# Patient Record
Sex: FEMALE | Race: WHITE | NOT HISPANIC OR LATINO | Employment: OTHER | URBAN - METROPOLITAN AREA
[De-identification: names, ages, dates, MRNs, and addresses within clinical notes are randomized per-mention and may not be internally consistent; named-entity substitution may affect disease eponyms.]

---

## 2023-03-16 PROBLEM — R11.2 NAUSEA WITH VOMITING: Status: ACTIVE | Noted: 2023-03-16

## 2023-03-16 PROBLEM — F32.A ANXIETY AND DEPRESSION: Status: ACTIVE | Noted: 2023-03-16

## 2023-03-16 PROBLEM — K91.30 SMALL BOWEL ANASTOMOTIC STRICTURE: Status: ACTIVE | Noted: 2023-03-16

## 2023-03-16 PROBLEM — K63.8219 SMALL INTESTINAL BACTERIAL OVERGROWTH: Status: ACTIVE | Noted: 2023-03-16

## 2023-03-16 PROBLEM — T40.601A NARCOTIC BOWEL SYNDROME (MULTI): Status: ACTIVE | Noted: 2023-03-16

## 2023-03-16 PROBLEM — R10.13 EPIGASTRIC PAIN: Status: ACTIVE | Noted: 2023-03-16

## 2023-03-16 PROBLEM — K43.9 HERNIA, EPIGASTRIC: Status: ACTIVE | Noted: 2023-03-16

## 2023-03-16 PROBLEM — R19.7 DIARRHEA: Status: ACTIVE | Noted: 2023-03-16

## 2023-03-16 PROBLEM — K91.89 SMALL BOWEL ANASTOMOTIC STRICTURE: Status: ACTIVE | Noted: 2023-03-16

## 2023-03-16 PROBLEM — F41.9 ANXIETY AND DEPRESSION: Status: ACTIVE | Noted: 2023-03-16

## 2023-03-16 PROBLEM — K58.9 IRRITABLE BOWEL SYNDROME (IBS): Status: ACTIVE | Noted: 2023-03-16

## 2023-03-16 PROBLEM — F11.90 OPIOID USE DISORDER: Status: ACTIVE | Noted: 2023-03-16

## 2023-03-16 PROBLEM — M79.10 MYALGIA: Status: ACTIVE | Noted: 2023-03-16

## 2023-03-16 PROBLEM — M25.50 ARTHRALGIA: Status: ACTIVE | Noted: 2023-03-16

## 2023-03-16 PROBLEM — R10.11 ABDOMINAL PAIN, RUQ (RIGHT UPPER QUADRANT): Status: ACTIVE | Noted: 2023-03-16

## 2023-03-16 PROBLEM — F43.10 PTSD (POST-TRAUMATIC STRESS DISORDER): Status: ACTIVE | Noted: 2023-03-16

## 2023-03-16 PROBLEM — R14.0 ABDOMINAL BLOATING: Status: ACTIVE | Noted: 2023-03-16

## 2023-03-16 PROBLEM — K50.011: Status: ACTIVE | Noted: 2023-03-16

## 2023-03-16 PROBLEM — K59.00 CONSTIPATION: Status: ACTIVE | Noted: 2023-03-16

## 2023-03-16 PROBLEM — L03.032 PARONYCHIA OF TOE OF LEFT FOOT: Status: ACTIVE | Noted: 2023-03-16

## 2023-03-16 PROBLEM — G57.93 NEUROPATHY INVOLVING BOTH LOWER EXTREMITIES: Status: ACTIVE | Noted: 2023-03-16

## 2023-03-16 PROBLEM — T30.0 BURN: Status: ACTIVE | Noted: 2023-03-16

## 2023-03-16 PROBLEM — K63.89 NARCOTIC BOWEL SYNDROME (MULTI): Status: ACTIVE | Noted: 2023-03-16

## 2023-03-16 PROBLEM — R20.0 BILATERAL HAND NUMBNESS: Status: ACTIVE | Noted: 2023-03-16

## 2023-03-16 PROBLEM — T14.8XXA WOUND OF SKIN: Status: ACTIVE | Noted: 2023-03-16

## 2023-03-16 PROBLEM — F17.200 NICOTINE DEPENDENCE: Status: ACTIVE | Noted: 2023-03-16

## 2023-03-16 PROBLEM — Q45.3 PANCREAS DIVISUM: Status: ACTIVE | Noted: 2023-03-16

## 2023-03-16 RX ORDER — BUPRENORPHINE AND NALOXONE 8; 2 MG/1; MG/1
1 FILM, SOLUBLE BUCCAL; SUBLINGUAL 2 TIMES DAILY
COMMUNITY
Start: 2022-04-26 | End: 2023-03-20 | Stop reason: ALTCHOICE

## 2023-03-16 RX ORDER — BUPROPION HYDROCHLORIDE 150 MG/1
1 TABLET, EXTENDED RELEASE ORAL 2 TIMES DAILY
COMMUNITY
Start: 2022-09-23 | End: 2023-03-20 | Stop reason: SDUPTHER

## 2023-03-16 RX ORDER — RISANKIZUMAB-RZAA 60 MG/ML
600 INJECTION INTRAVENOUS
COMMUNITY
Start: 2022-09-15 | End: 2023-08-31 | Stop reason: ALTCHOICE

## 2023-03-16 RX ORDER — GABAPENTIN 300 MG/1
1 CAPSULE ORAL 3 TIMES DAILY
COMMUNITY
Start: 2022-05-04 | End: 2023-03-20 | Stop reason: SDUPTHER

## 2023-03-16 RX ORDER — HYDROXYZINE HYDROCHLORIDE 50 MG/1
1 TABLET, FILM COATED ORAL 3 TIMES DAILY PRN
COMMUNITY
End: 2023-03-20 | Stop reason: SDUPTHER

## 2023-03-16 RX ORDER — PRAZOSIN HYDROCHLORIDE 1 MG/1
1 CAPSULE ORAL NIGHTLY
COMMUNITY
End: 2023-03-20 | Stop reason: ALTCHOICE

## 2023-03-16 RX ORDER — SIMETHICONE 80 MG
1 TABLET,CHEWABLE ORAL 4 TIMES DAILY
COMMUNITY
Start: 2022-08-14

## 2023-03-16 RX ORDER — OLANZAPINE 5 MG/1
1 TABLET ORAL NIGHTLY
COMMUNITY
End: 2023-03-20 | Stop reason: SDUPTHER

## 2023-03-16 RX ORDER — IVERMECTIN 3 MG/1
6 TABLET ORAL ONCE
COMMUNITY
End: 2023-03-20 | Stop reason: ALTCHOICE

## 2023-03-16 RX ORDER — POLYETHYLENE GLYCOL 3350 17 G/17G
238 POWDER, FOR SOLUTION ORAL
COMMUNITY
Start: 2022-05-11 | End: 2023-12-07 | Stop reason: DRUGHIGH

## 2023-03-16 RX ORDER — LORATADINE 10 MG/1
1 TABLET ORAL DAILY PRN
COMMUNITY

## 2023-03-16 RX ORDER — BUSPIRONE HYDROCHLORIDE 30 MG/1
1 TABLET ORAL 2 TIMES DAILY
COMMUNITY
End: 2023-03-20 | Stop reason: SDUPTHER

## 2023-03-16 RX ORDER — CLONIDINE HYDROCHLORIDE 0.2 MG/1
1 TABLET ORAL 3 TIMES DAILY
COMMUNITY
End: 2023-03-20 | Stop reason: SDUPTHER

## 2023-03-16 RX ORDER — QUETIAPINE FUMARATE 50 MG/1
1-2 TABLET, FILM COATED ORAL NIGHTLY PRN
COMMUNITY
End: 2023-03-20 | Stop reason: ALTCHOICE

## 2023-03-16 RX ORDER — BUPROPION HYDROCHLORIDE 300 MG/1
1 TABLET ORAL DAILY
COMMUNITY
End: 2023-03-20 | Stop reason: ALTCHOICE

## 2023-03-16 RX ORDER — PANTOPRAZOLE SODIUM 40 MG/1
1 TABLET, DELAYED RELEASE ORAL DAILY
COMMUNITY
Start: 2022-05-02

## 2023-03-16 RX ORDER — RISANKIZUMAB-RZAA 360 MG/2.4
360 WEARABLE INJECTOR SUBCUTANEOUS
COMMUNITY
Start: 2022-09-15 | End: 2023-08-31 | Stop reason: ALTCHOICE

## 2023-03-16 RX ORDER — ONDANSETRON 4 MG/1
1 TABLET, ORALLY DISINTEGRATING ORAL EVERY 8 HOURS PRN
COMMUNITY

## 2023-03-16 RX ORDER — ESCITALOPRAM OXALATE 20 MG/1
1 TABLET ORAL DAILY
COMMUNITY
End: 2023-03-20 | Stop reason: SDUPTHER

## 2023-03-16 RX ORDER — DULOXETIN HYDROCHLORIDE 30 MG/1
CAPSULE, DELAYED RELEASE ORAL
COMMUNITY
Start: 2022-10-04 | End: 2023-03-20 | Stop reason: SDUPTHER

## 2023-03-20 ENCOUNTER — TELEMEDICINE (OUTPATIENT)
Dept: PRIMARY CARE | Facility: CLINIC | Age: 43
End: 2023-03-20
Payer: COMMERCIAL

## 2023-03-20 DIAGNOSIS — F32.A ANXIETY AND DEPRESSION: Primary | ICD-10-CM

## 2023-03-20 DIAGNOSIS — F41.9 ANXIETY AND DEPRESSION: Primary | ICD-10-CM

## 2023-03-20 DIAGNOSIS — M79.10 MYALGIA: ICD-10-CM

## 2023-03-20 PROCEDURE — 99214 OFFICE O/P EST MOD 30 MIN: CPT | Performed by: INTERNAL MEDICINE

## 2023-03-20 RX ORDER — GABAPENTIN 300 MG/1
300 CAPSULE ORAL 3 TIMES DAILY
Qty: 270 CAPSULE | Refills: 0 | Status: SHIPPED | OUTPATIENT
Start: 2023-03-20 | End: 2023-06-01 | Stop reason: SDUPTHER

## 2023-03-20 RX ORDER — BUSPIRONE HYDROCHLORIDE 30 MG/1
30 TABLET ORAL 2 TIMES DAILY
Qty: 180 TABLET | Refills: 0 | Status: SHIPPED | OUTPATIENT
Start: 2023-03-20 | End: 2023-06-14

## 2023-03-20 RX ORDER — HYDROXYZINE HYDROCHLORIDE 50 MG/1
50 TABLET, FILM COATED ORAL 3 TIMES DAILY PRN
Qty: 270 TABLET | Refills: 0 | Status: SHIPPED | OUTPATIENT
Start: 2023-03-20 | End: 2023-06-29 | Stop reason: SDUPTHER

## 2023-03-20 RX ORDER — CLONIDINE HYDROCHLORIDE 0.2 MG/1
0.2 TABLET ORAL 3 TIMES DAILY
Qty: 270 TABLET | Refills: 0 | Status: SHIPPED | OUTPATIENT
Start: 2023-03-20 | End: 2023-06-01 | Stop reason: SDUPTHER

## 2023-03-20 RX ORDER — ESCITALOPRAM OXALATE 20 MG/1
20 TABLET ORAL DAILY
Qty: 90 TABLET | Refills: 0 | Status: SHIPPED | OUTPATIENT
Start: 2023-03-20 | End: 2023-06-14

## 2023-03-20 RX ORDER — QUETIAPINE FUMARATE 200 MG/1
200 TABLET, FILM COATED ORAL NIGHTLY
Qty: 90 TABLET | Refills: 0 | Status: SHIPPED | OUTPATIENT
Start: 2023-03-20 | End: 2023-06-01 | Stop reason: SDUPTHER

## 2023-03-20 RX ORDER — OLANZAPINE 5 MG/1
5 TABLET ORAL NIGHTLY
Qty: 90 TABLET | Refills: 0 | Status: SHIPPED | OUTPATIENT
Start: 2023-03-20 | End: 2023-06-29 | Stop reason: SDUPTHER

## 2023-03-20 RX ORDER — QUETIAPINE FUMARATE 25 MG/1
25 TABLET, FILM COATED ORAL 2 TIMES DAILY
Qty: 180 TABLET | Refills: 0 | Status: SHIPPED | OUTPATIENT
Start: 2023-03-20 | End: 2023-06-01 | Stop reason: SDUPTHER

## 2023-03-20 RX ORDER — BUPROPION HYDROCHLORIDE 150 MG/1
150 TABLET, EXTENDED RELEASE ORAL 2 TIMES DAILY
Qty: 180 TABLET | Refills: 0 | Status: SHIPPED | OUTPATIENT
Start: 2023-03-20 | End: 2023-06-14

## 2023-03-20 RX ORDER — DULOXETIN HYDROCHLORIDE 30 MG/1
30 CAPSULE, DELAYED RELEASE ORAL DAILY
Qty: 90 CAPSULE | Refills: 0 | Status: SHIPPED | OUTPATIENT
Start: 2023-03-20 | End: 2023-06-14

## 2023-03-20 NOTE — PROGRESS NOTES
Subjective   Patient ID: Kym Jennings is a 42 y.o. female who presents for Follow-up.    HPI   Patient has been sober since June 6, 2021. No longer on buprenorphine. She is living in [place] with family. She is only attending occasional meetings, sponsor is [employer]. Reports anxiety and depression are well controlled. Has 2 panic attacks weekly, takes hydroxyzine which helps. No SI. No longer following at [facility] for mental health issues.   Review of Systems   All other systems reviewed and are negative.      Objective   There were no vitals taken for this visit.    Physical Exam  Constitutional:       Appearance: Normal appearance.   Pulmonary:      Effort: Pulmonary effort is normal.   Neurological:      Mental Status: She is alert.   Psychiatric:         Mood and Affect: Mood normal.         Behavior: Behavior normal.         Thought Content: Thought content normal.         Assessment/Plan       #Anxiety, depression , PTSD  -Refill buspirone 30mg BID, clonidine 0.2mg TID, escitalopram 20mg daily, duloxetine ER 30mg daily, hydroxyzine 50mg TID prn, Zyprexa 5mg daily, quetiapine 25mg BID and 200mg nightly, bupropion SR 150mg BID   -Recommended follow up with Ashley Sarah at [facility] for further mental health care    #Myalgia   -Refill gabapentin 300mg TID

## 2023-06-01 DIAGNOSIS — F41.9 ANXIETY AND DEPRESSION: ICD-10-CM

## 2023-06-01 DIAGNOSIS — M79.10 MYALGIA: ICD-10-CM

## 2023-06-01 DIAGNOSIS — F32.A ANXIETY AND DEPRESSION: ICD-10-CM

## 2023-06-01 RX ORDER — QUETIAPINE FUMARATE 25 MG/1
25 TABLET, FILM COATED ORAL 2 TIMES DAILY
Qty: 180 TABLET | Refills: 0 | Status: SHIPPED | OUTPATIENT
Start: 2023-06-01 | End: 2023-06-29 | Stop reason: ALTCHOICE

## 2023-06-01 RX ORDER — QUETIAPINE FUMARATE 200 MG/1
200 TABLET, FILM COATED ORAL NIGHTLY
Qty: 90 TABLET | Refills: 0 | Status: SHIPPED | OUTPATIENT
Start: 2023-06-01 | End: 2023-06-29 | Stop reason: SDUPTHER

## 2023-06-01 RX ORDER — CLONIDINE HYDROCHLORIDE 0.2 MG/1
TABLET ORAL 3 TIMES DAILY PRN
COMMUNITY
Start: 2022-02-17 | End: 2023-08-31 | Stop reason: ALTCHOICE

## 2023-06-01 RX ORDER — CLONIDINE HYDROCHLORIDE 0.2 MG/1
0.2 TABLET ORAL 3 TIMES DAILY
Qty: 270 TABLET | Refills: 0 | Status: SHIPPED | OUTPATIENT
Start: 2023-06-01 | End: 2023-06-29 | Stop reason: SDUPTHER

## 2023-06-01 RX ORDER — GABAPENTIN 300 MG/1
300 CAPSULE ORAL 3 TIMES DAILY
Qty: 270 CAPSULE | Refills: 0 | Status: SHIPPED | OUTPATIENT
Start: 2023-06-01 | End: 2023-06-29 | Stop reason: SDUPTHER

## 2023-06-01 NOTE — TELEPHONE ENCOUNTER
Requested Prescriptions     Pending Prescriptions Disp Refills    gabapentin (Neurontin) 300 mg capsule 270 capsule 0     Sig: Take 1 capsule (300 mg) by mouth 3 times a day.    QUEtiapine (SEROquel) 25 mg tablet 180 tablet 0     Sig: Take 1 tablet (25 mg) by mouth 2 times a day.    QUEtiapine (Seroquel) 200 mg tablet 90 tablet 0     Sig: Take 1 tablet (200 mg) by mouth once daily at bedtime.    cloNIDine (Catapres) 0.2 mg tablet 270 tablet 0     Sig: Take 1 tablet (0.2 mg) by mouth 3 times a day.

## 2023-06-13 DIAGNOSIS — F41.9 ANXIETY AND DEPRESSION: ICD-10-CM

## 2023-06-13 DIAGNOSIS — F32.A ANXIETY AND DEPRESSION: ICD-10-CM

## 2023-06-14 RX ORDER — BUSPIRONE HYDROCHLORIDE 30 MG/1
TABLET ORAL
Qty: 180 TABLET | Refills: 0 | Status: SHIPPED | OUTPATIENT
Start: 2023-06-14 | End: 2023-06-29 | Stop reason: SDUPTHER

## 2023-06-14 RX ORDER — DULOXETIN HYDROCHLORIDE 30 MG/1
CAPSULE, DELAYED RELEASE ORAL
Qty: 90 CAPSULE | Refills: 0 | Status: SHIPPED | OUTPATIENT
Start: 2023-06-14 | End: 2023-06-29 | Stop reason: SDUPTHER

## 2023-06-14 RX ORDER — BUPROPION HYDROCHLORIDE 150 MG/1
TABLET, EXTENDED RELEASE ORAL
Qty: 180 TABLET | Refills: 0 | Status: SHIPPED | OUTPATIENT
Start: 2023-06-14 | End: 2023-06-29 | Stop reason: SDUPTHER

## 2023-06-14 RX ORDER — ESCITALOPRAM OXALATE 20 MG/1
TABLET ORAL
Qty: 90 TABLET | Refills: 0 | Status: SHIPPED | OUTPATIENT
Start: 2023-06-14 | End: 2023-06-29 | Stop reason: SDUPTHER

## 2023-06-28 ENCOUNTER — APPOINTMENT (OUTPATIENT)
Dept: PRIMARY CARE | Facility: CLINIC | Age: 43
End: 2023-06-28
Payer: MEDICAID

## 2023-06-29 ENCOUNTER — OFFICE VISIT (OUTPATIENT)
Dept: PRIMARY CARE | Facility: CLINIC | Age: 43
End: 2023-06-29
Payer: MEDICAID

## 2023-06-29 VITALS
SYSTOLIC BLOOD PRESSURE: 150 MMHG | OXYGEN SATURATION: 96 % | DIASTOLIC BLOOD PRESSURE: 88 MMHG | BODY MASS INDEX: 26.47 KG/M2 | HEART RATE: 102 BPM | WEIGHT: 169 LBS

## 2023-06-29 DIAGNOSIS — F11.90 OPIOID USE DISORDER: Primary | ICD-10-CM

## 2023-06-29 DIAGNOSIS — M79.10 MYALGIA: ICD-10-CM

## 2023-06-29 DIAGNOSIS — F41.9 ANXIETY AND DEPRESSION: ICD-10-CM

## 2023-06-29 DIAGNOSIS — R30.0 DYSURIA: ICD-10-CM

## 2023-06-29 DIAGNOSIS — F32.A ANXIETY AND DEPRESSION: ICD-10-CM

## 2023-06-29 DIAGNOSIS — Z79.899 MEDICATION MANAGEMENT: ICD-10-CM

## 2023-06-29 PROCEDURE — 80358 DRUG SCREENING METHADONE: CPT

## 2023-06-29 PROCEDURE — 80365 DRUG SCREENING OXYCODONE: CPT

## 2023-06-29 PROCEDURE — 87491 CHLMYD TRACH DNA AMP PROBE: CPT

## 2023-06-29 PROCEDURE — 80368 SEDATIVE HYPNOTICS: CPT

## 2023-06-29 PROCEDURE — 80361 OPIATES 1 OR MORE: CPT

## 2023-06-29 PROCEDURE — 80354 DRUG SCREENING FENTANYL: CPT

## 2023-06-29 PROCEDURE — 80373 DRUG SCREENING TRAMADOL: CPT

## 2023-06-29 PROCEDURE — 80307 DRUG TEST PRSMV CHEM ANLYZR: CPT

## 2023-06-29 PROCEDURE — 99214 OFFICE O/P EST MOD 30 MIN: CPT | Performed by: INTERNAL MEDICINE

## 2023-06-29 PROCEDURE — 87591 N.GONORRHOEAE DNA AMP PROB: CPT

## 2023-06-29 PROCEDURE — 80346 BENZODIAZEPINES1-12: CPT

## 2023-06-29 RX ORDER — BUPRENORPHINE AND NALOXONE 8; 2 MG/1; MG/1
1 FILM, SOLUBLE BUCCAL; SUBLINGUAL 2 TIMES DAILY
Qty: 14 FILM | Refills: 0 | Status: SHIPPED | OUTPATIENT
Start: 2023-06-29 | End: 2023-07-07 | Stop reason: ALTCHOICE

## 2023-06-29 RX ORDER — BUPROPION HYDROCHLORIDE 150 MG/1
150 TABLET, EXTENDED RELEASE ORAL 2 TIMES DAILY
Qty: 180 TABLET | Refills: 0 | Status: SHIPPED | OUTPATIENT
Start: 2023-06-29

## 2023-06-29 RX ORDER — BUSPIRONE HYDROCHLORIDE 30 MG/1
30 TABLET ORAL 2 TIMES DAILY
Qty: 180 TABLET | Refills: 0 | Status: SHIPPED | OUTPATIENT
Start: 2023-06-29

## 2023-06-29 RX ORDER — GABAPENTIN 300 MG/1
300 CAPSULE ORAL 3 TIMES DAILY
Qty: 270 CAPSULE | Refills: 0 | Status: SHIPPED | OUTPATIENT
Start: 2023-06-29 | End: 2023-08-31 | Stop reason: SDUPTHER

## 2023-06-29 RX ORDER — CLONIDINE HYDROCHLORIDE 0.2 MG/1
0.2 TABLET ORAL 3 TIMES DAILY
Qty: 270 TABLET | Refills: 0 | Status: SHIPPED | OUTPATIENT
Start: 2023-06-29

## 2023-06-29 RX ORDER — BUPRENORPHINE 300 MG/1
300 SOLUTION SUBCUTANEOUS
Qty: 1.5 ML | Refills: 1 | Status: SHIPPED | OUTPATIENT
Start: 2023-06-29 | End: 2023-08-28 | Stop reason: SDUPTHER

## 2023-06-29 RX ORDER — BUPRENORPHINE AND NALOXONE 8; 2 MG/1; MG/1
FILM, SOLUBLE BUCCAL; SUBLINGUAL
COMMUNITY
Start: 2023-06-21 | End: 2023-06-29 | Stop reason: ALTCHOICE

## 2023-06-29 RX ORDER — QUETIAPINE FUMARATE 200 MG/1
200 TABLET, FILM COATED ORAL NIGHTLY
Qty: 90 TABLET | Refills: 0 | Status: SHIPPED | OUTPATIENT
Start: 2023-06-29

## 2023-06-29 RX ORDER — OLANZAPINE 5 MG/1
5 TABLET ORAL NIGHTLY
Qty: 90 TABLET | Refills: 0 | Status: SHIPPED | OUTPATIENT
Start: 2023-06-29

## 2023-06-29 RX ORDER — DULOXETIN HYDROCHLORIDE 30 MG/1
CAPSULE, DELAYED RELEASE ORAL
Qty: 90 CAPSULE | Refills: 0 | Status: SHIPPED | OUTPATIENT
Start: 2023-06-29 | End: 2023-07-21 | Stop reason: ALTCHOICE

## 2023-06-29 RX ORDER — HYDROXYZINE HYDROCHLORIDE 50 MG/1
50 TABLET, FILM COATED ORAL 3 TIMES DAILY PRN
Qty: 270 TABLET | Refills: 0 | Status: SHIPPED | OUTPATIENT
Start: 2023-06-29

## 2023-06-29 RX ORDER — ESCITALOPRAM OXALATE 20 MG/1
20 TABLET ORAL DAILY
Qty: 90 TABLET | Refills: 0 | Status: SHIPPED | OUTPATIENT
Start: 2023-06-29

## 2023-06-29 NOTE — PROGRESS NOTES
Subjective   Patient ID: Kym Jennings is a 42 y.o. female who presents for UTI and Med Refill.    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 No sponsor     Reports urinary urgency x2 weeks. +dysuria but no frequency. Took 4 days of bactrim at [facility].      Review of Systems   All other systems reviewed and are negative.      Objective   /88   Pulse 102   Wt 76.7 kg (169 lb)   SpO2 96%   BMI 26.47 kg/m²     Physical Exam  Constitutional:       General: She is not in acute distress.     Appearance: Normal appearance.   HENT:      Head: Normocephalic and atraumatic.      Right Ear: Tympanic membrane normal.      Left Ear: Tympanic membrane normal.      Nose: Nose normal.      Mouth/Throat:      Mouth: Mucous membranes are moist.   Eyes:      Extraocular Movements: Extraocular movements intact.      Conjunctiva/sclera: Conjunctivae normal.      Pupils: Pupils are equal, round, and reactive to light.   Cardiovascular:      Rate and Rhythm: Normal rate and regular rhythm.      Heart sounds: Normal heart sounds. No murmur heard.     No gallop.   Pulmonary:      Effort: Pulmonary effort is normal. No respiratory distress.      Breath sounds: No wheezing, rhonchi or rales.   Abdominal:      General: Abdomen is flat. Bowel sounds are normal.      Palpations: Abdomen is soft.      Tenderness: There is no abdominal tenderness.   Musculoskeletal:         General: No swelling. Normal range of motion.      Cervical back: Normal range of motion and neck supple.   Skin:     General: Skin is warm and dry.      Capillary Refill: Capillary refill takes less than 2 seconds.      Findings: No rash.   Neurological:      General: No focal deficit present.      Mental Status: She is alert and oriented to person, place,  and time. Mental status is at baseline.   Psychiatric:         Mood and Affect: Mood normal.         Behavior: Behavior normal.         Assessment/Plan       #OUD  -Rx Suboxone 8/2mg film BID x7 days   -UDS sent to lab (+meth, amp IO)  -Will get CSA next visit   -Cont PHP and meetings   -Will order Sublocade 300mg for next week   -  I have personally reviewed the OARRS report for . This report is scanned into the electronic medical record. I have considered the risks of abuse, dependence, addiction and diversion.      #Anxiety, depression , PTSD  -Refill buspirone 30mg BID, clonidine 0.2mg TID, escitalopram 20mg daily, duloxetine ER 30mg daily, hydroxyzine 50mg TID prn, Zyprexa 5mg daily, quetiapine 25mg BID and 200mg nightly, bupropion SR 150mg BID     #Dysuria   -UA without LE or nitrite  -order GC/Chlam, if neg will have her follow up with gyn     RTC 1 week for Sublocade

## 2023-06-30 LAB
CHLAMYDIA TRACH., AMPLIFIED: NEGATIVE
N. GONORRHEA, AMPLIFIED: NEGATIVE

## 2023-07-07 ENCOUNTER — OFFICE VISIT (OUTPATIENT)
Dept: PRIMARY CARE | Facility: CLINIC | Age: 43
End: 2023-07-07
Payer: MEDICAID

## 2023-07-07 VITALS
WEIGHT: 172 LBS | DIASTOLIC BLOOD PRESSURE: 77 MMHG | SYSTOLIC BLOOD PRESSURE: 118 MMHG | BODY MASS INDEX: 26.94 KG/M2 | HEART RATE: 99 BPM | OXYGEN SATURATION: 96 %

## 2023-07-07 DIAGNOSIS — F11.90 OPIOID USE DISORDER: Primary | ICD-10-CM

## 2023-07-07 DIAGNOSIS — F41.9 ANXIETY AND DEPRESSION: ICD-10-CM

## 2023-07-07 DIAGNOSIS — F32.A ANXIETY AND DEPRESSION: ICD-10-CM

## 2023-07-07 PROCEDURE — 80348 DRUG SCREENING BUPRENORPHINE: CPT

## 2023-07-07 PROCEDURE — 99213 OFFICE O/P EST LOW 20 MIN: CPT | Performed by: INTERNAL MEDICINE

## 2023-07-07 RX ORDER — ESTRADIOL 1 MG/1
1 TABLET ORAL DAILY
Status: CANCELLED | OUTPATIENT
Start: 2023-07-07

## 2023-07-07 RX ORDER — ESTRADIOL 1 MG/1
1 TABLET ORAL DAILY
COMMUNITY
Start: 2022-10-26

## 2023-07-07 NOTE — PROGRESS NOTES
Subjective   Patient ID: Kym Jennings is a 42 y.o. female who presents for Follow-up.    HPI     Overall doing well   Denies use of illicit drug use. No EtoH use   Mood is stable   Not sleeping well , not taking her Seroquel due to next day lethargy  Going to NA meetings nightly, still going to PHP   Now working with [facility]   No med side effects     Review of Systems   All other systems reviewed and are negative.      Objective   /77   Pulse 99   Wt 78 kg (172 lb)   SpO2 96%   BMI 26.94 kg/m²     Physical Exam  Constitutional:       Appearance: Normal appearance.   Pulmonary:      Effort: Pulmonary effort is normal.   Neurological:      Mental Status: She is alert.   Psychiatric:         Mood and Affect: Mood normal.         Behavior: Behavior normal.         Thought Content: Thought content normal.         Assessment/Plan       #OUD  -Sublocade 300mg subcutaneous x 1 given (right abdomen, LOT: [id number], Ex 6/2004)  -UDS sent to lab   -Will get CSA next visit   -Cont PHP and meetings   -  I have personally reviewed the OARRS report for . This report is scanned into the electronic medical record. I have considered the risks of abuse, dependence, addiction and diversion.    Not assessed:        #Anxiety, depression , PTSD  -Refill buspirone 30mg BID, clonidine 0.2mg TID, escitalopram 20mg daily, duloxetine ER 30mg daily, hydroxyzine 50mg TID prn, Zyprexa 5mg daily, quetiapine 25mg BID and 200mg nightly, bupropion SR 150mg BID

## 2023-07-10 ENCOUNTER — APPOINTMENT (OUTPATIENT)
Dept: PRIMARY CARE | Facility: CLINIC | Age: 43
End: 2023-07-10
Payer: MEDICAID

## 2023-07-13 LAB
6-ACETYLMORPHINE: <25 NG/ML
7-AMINOCLONAZEPAM: <25 NG/ML
ALPHA-HYDROXYALPRAZOLAM: <25 NG/ML
ALPHA-HYDROXYMIDAZOLAM: <25 NG/ML
ALPRAZOLAM: <25 NG/ML
AMPHETAMINE (PRESENCE) IN URINE BY SCREEN METHOD: NORMAL
BARBITURATES PRESENCE IN URINE BY SCREEN METHOD: NORMAL
BUPRENORPHINE ,URINE: 3 NG/ML
BUPRENORPHINE GLUC, URINE: 371 NG/ML
CANNABINOIDS IN URINE BY SCREEN METHOD: NORMAL
CHLORDIAZEPOXIDE: <25 NG/ML
CLONAZEPAM: <25 NG/ML
COCAINE (PRESENCE) IN URINE BY SCREEN METHOD: NORMAL
CODEINE: <50 NG/ML
CREATINE, URINE FOR DRUG: 125.1 MG/DL
DIAZEPAM: <25 NG/ML
DRUG SCREEN COMMENT URINE: NORMAL
EDDP: <25 NG/ML
FENTANYL CONFIRMATION, URINE: <2.5 NG/ML
HYDROCODONE: <25 NG/ML
HYDROMORPHONE: <25 NG/ML
LORAZEPAM: <25 NG/ML
METHADONE CONFIRMATION,URINE: <25 NG/ML
MIDAZOLAM: <25 NG/ML
MORPHINE URINE: <50 NG/ML
NALOXONE, URINE: <100 NG/ML
NORBUPRENORPHINE GLUC,URINE: 587 NG/ML
NORBUPRENORPHINE, URINE: 118 NG/ML
NORDIAZEPAM: <25 NG/ML
NORFENTANYL: <2.5 NG/ML
NORHYDROCODONE: <25 NG/ML
NOROXYCODONE: <25 NG/ML
O-DESMETHYLTRAMADOL: <50 NG/ML
OXAZEPAM: <25 NG/ML
OXYCODONE: <25 NG/ML
OXYMORPHONE: <25 NG/ML
PHENCYCLIDINE (PRESENCE) IN URINE BY SCREEN METHOD: NORMAL
TEMAZEPAM: <25 NG/ML
TRAMADOL: <50 NG/ML
ZOLPIDEM METABOLITE (ZCA): <25 NG/ML
ZOLPIDEM: <25 NG/ML

## 2023-07-17 ENCOUNTER — TELEPHONE (OUTPATIENT)
Dept: PRIMARY CARE | Facility: CLINIC | Age: 43
End: 2023-07-17
Payer: MEDICAID

## 2023-07-19 ENCOUNTER — APPOINTMENT (OUTPATIENT)
Dept: PRIMARY CARE | Facility: CLINIC | Age: 43
End: 2023-07-19
Payer: MEDICAID

## 2023-07-21 ENCOUNTER — TELEMEDICINE (OUTPATIENT)
Dept: PRIMARY CARE | Facility: CLINIC | Age: 43
End: 2023-07-21
Payer: MEDICAID

## 2023-07-21 ENCOUNTER — APPOINTMENT (OUTPATIENT)
Dept: PRIMARY CARE | Facility: CLINIC | Age: 43
End: 2023-07-21
Payer: MEDICAID

## 2023-07-21 DIAGNOSIS — F32.A ANXIETY AND DEPRESSION: Primary | ICD-10-CM

## 2023-07-21 DIAGNOSIS — F41.9 ANXIETY AND DEPRESSION: Primary | ICD-10-CM

## 2023-07-21 PROCEDURE — 99442 PR PHYS/QHP TELEPHONE EVALUATION 11-20 MIN: CPT | Performed by: INTERNAL MEDICINE

## 2023-07-21 RX ORDER — DULOXETIN HYDROCHLORIDE 60 MG/1
60 CAPSULE, DELAYED RELEASE ORAL DAILY
Qty: 30 CAPSULE | Refills: 3 | Status: SHIPPED | OUTPATIENT
Start: 2023-07-21 | End: 2024-02-06

## 2023-07-21 NOTE — PROGRESS NOTES
Subjective   Patient ID: Kym Jennings is a 43 y.o. female who presents for Follow-up.    HPI     Over the past anxiety is getting much worse  Cravings have increased   She deneis illicit drugs   Has been to 4 meetings this week and PHP daily   She did a pysch assessment with [facility] 2 weeks ago and they not addressed her psych issues    Review of Systems   All other systems reviewed and are negative.      Objective   There were no vitals taken for this visit.    NAD  Talks in complete sentences  Mood and affect are appropriate       Assessment/Plan       #Anxiety, depression, PTSD  -Will increase duloxetine ER to 60mg daily  -Cont buspirone 30mg BID, clonidine 0.2mg TID, escitalopram 20mg daily, , hydroxyzine 50mg TID prn, Zyprexa 5mg daily, quetiapine 25mg BID and 200mg nightly, bupropion SR 150mg BID   -Recommended she contact [facility] for further psych recs

## 2023-08-28 DIAGNOSIS — F11.90 OPIOID USE DISORDER: ICD-10-CM

## 2023-08-28 RX ORDER — BUPRENORPHINE 300 MG/1
300 SOLUTION SUBCUTANEOUS
Qty: 1.5 ML | Refills: 1 | Status: CANCELLED | OUTPATIENT
Start: 2023-08-28 | End: 2023-10-27

## 2023-08-28 RX ORDER — BUPRENORPHINE 300 MG/1
300 SOLUTION SUBCUTANEOUS
Qty: 1.5 ML | Refills: 0 | Status: SHIPPED | OUTPATIENT
Start: 2023-08-28 | End: 2023-09-27

## 2023-08-29 NOTE — PROGRESS NOTES
Subjective   Patient ID: Kym Jennings is a 43 y.o. female who presents for Med Management.    HPI     Patient turned herself in to [facility] for previous huffing charges  She has been using Adderall , meth , THC and suboxone  She has been seeing [facility] and getting 12mg daily   Mood has been stable  Will likely be going to detention in 4 days   Dad is going into hospice for cancer     Review of Systems   All other systems reviewed and are negative.      Objective   There were no vitals taken for this visit.    Physical Exam  Constitutional:       Appearance: Normal appearance.   HENT:      Head: Normocephalic and atraumatic.   Cardiovascular:      Rate and Rhythm: Normal rate and regular rhythm.      Heart sounds: Normal heart sounds. No murmur heard.     No gallop.   Pulmonary:      Effort: Pulmonary effort is normal. No respiratory distress.      Breath sounds: No wheezing or rales.   Skin:     General: Skin is warm and dry.      Findings: No rash.   Neurological:      Mental Status: She is alert and oriented to person, place, and time. Mental status is at baseline.   Psychiatric:         Mood and Affect: Mood normal.         Behavior: Behavior normal.         Assessment/Plan       #OUD  -Sublocade 300mg subcutaneous x 1 given (left abdomen, LOT: [id number], Ex 1/2025)  -UDS sent to lab   -CSA today    -  I have personally reviewed the OARRS report for . This report is scanned into the electronic medical record. I have considered the risks of abuse, dependence, addiction and diversion.    Patient to follow up in 4 weeks if she does not go to [facility]

## 2023-08-31 ENCOUNTER — OFFICE VISIT (OUTPATIENT)
Dept: PRIMARY CARE | Facility: CLINIC | Age: 43
End: 2023-08-31
Payer: MEDICAID

## 2023-08-31 VITALS
BODY MASS INDEX: 24.96 KG/M2 | DIASTOLIC BLOOD PRESSURE: 82 MMHG | SYSTOLIC BLOOD PRESSURE: 127 MMHG | WEIGHT: 159 LBS | HEIGHT: 67 IN | OXYGEN SATURATION: 98 % | HEART RATE: 122 BPM

## 2023-08-31 DIAGNOSIS — F11.90 OPIOID USE DISORDER: ICD-10-CM

## 2023-08-31 DIAGNOSIS — M79.10 MYALGIA: ICD-10-CM

## 2023-08-31 DIAGNOSIS — Z79.899 MEDICATION MANAGEMENT: Primary | ICD-10-CM

## 2023-08-31 DIAGNOSIS — L73.9 FOLLICULITIS: ICD-10-CM

## 2023-08-31 PROCEDURE — 80349 CANNABINOIDS NATURAL: CPT

## 2023-08-31 PROCEDURE — 80361 OPIATES 1 OR MORE: CPT

## 2023-08-31 PROCEDURE — 80307 DRUG TEST PRSMV CHEM ANLYZR: CPT

## 2023-08-31 PROCEDURE — 99213 OFFICE O/P EST LOW 20 MIN: CPT | Performed by: INTERNAL MEDICINE

## 2023-08-31 PROCEDURE — 80348 DRUG SCREENING BUPRENORPHINE: CPT

## 2023-08-31 PROCEDURE — 80373 DRUG SCREENING TRAMADOL: CPT

## 2023-08-31 PROCEDURE — 80365 DRUG SCREENING OXYCODONE: CPT

## 2023-08-31 PROCEDURE — 80354 DRUG SCREENING FENTANYL: CPT

## 2023-08-31 PROCEDURE — 80368 SEDATIVE HYPNOTICS: CPT

## 2023-08-31 PROCEDURE — 80346 BENZODIAZEPINES1-12: CPT

## 2023-08-31 PROCEDURE — 80324 DRUG SCREEN AMPHETAMINES 1/2: CPT

## 2023-08-31 PROCEDURE — 80358 DRUG SCREENING METHADONE: CPT

## 2023-08-31 RX ORDER — BUPRENORPHINE AND NALOXONE 8; 2 MG/1; MG/1
FILM, SOLUBLE BUCCAL; SUBLINGUAL
COMMUNITY
Start: 2023-08-01 | End: 2023-11-06 | Stop reason: ALTCHOICE

## 2023-08-31 RX ORDER — DICLOFENAC SODIUM 10 MG/G
GEL TOPICAL
COMMUNITY
Start: 2023-06-07

## 2023-08-31 RX ORDER — GABAPENTIN 300 MG/1
300 CAPSULE ORAL 3 TIMES DAILY
Qty: 90 CAPSULE | Refills: 0 | Status: SHIPPED | OUTPATIENT
Start: 2023-08-31

## 2023-08-31 RX ORDER — DOXYCYCLINE 100 MG/1
100 CAPSULE ORAL 2 TIMES DAILY
Qty: 14 CAPSULE | Refills: 0 | Status: SHIPPED | OUTPATIENT
Start: 2023-08-31 | End: 2023-09-07

## 2023-08-31 RX ORDER — TRAZODONE HYDROCHLORIDE 100 MG/1
TABLET ORAL
COMMUNITY
Start: 2023-06-07

## 2023-08-31 RX ORDER — SERTRALINE HYDROCHLORIDE 50 MG/1
TABLET, FILM COATED ORAL
COMMUNITY
Start: 2023-06-20 | End: 2023-12-07

## 2023-08-31 ASSESSMENT — PATIENT HEALTH QUESTIONNAIRE - PHQ9
SUM OF ALL RESPONSES TO PHQ9 QUESTIONS 1 AND 2: 0
2. FEELING DOWN, DEPRESSED OR HOPELESS: NOT AT ALL
1. LITTLE INTEREST OR PLEASURE IN DOING THINGS: NOT AT ALL

## 2023-08-31 NOTE — PATIENT INSTRUCTIONS
You were given Sublocade 300mg injection today (this is your second one and you will need 100mg injection for next dose)    Take doxycycline for 7 days   Gabapentin sent to pharmacy

## 2023-09-04 LAB
AMPHETAMINES,URINE: >5000 NG/ML
BUPRENORPHINE ,URINE: 3 NG/ML
BUPRENORPHINE GLUC, URINE: 256 NG/ML
MDA,URINE: <200 NG/ML
MDEA,URINE: <200 NG/ML
MDMA,UR: <200 NG/ML
METHAMPHETAMINE QUANTITATIVE URINE: NORMAL NG/ML
NALOXONE, URINE: <100 NG/ML
NORBUPRENORPHINE GLUC,URINE: 325 NG/ML
NORBUPRENORPHINE, URINE: 177 NG/ML
PHENTERMINE,UR: <200 NG/ML

## 2023-09-05 LAB — 11-NOR-9-CARBOXY-THC, URN, QUANT: 46 NG/ML

## 2023-09-07 LAB
6-ACETYLMORPHINE: <25 NG/ML
7-AMINOCLONAZEPAM: <25 NG/ML
ALPHA-HYDROXYALPRAZOLAM: <25 NG/ML
ALPHA-HYDROXYMIDAZOLAM: <25 NG/ML
ALPRAZOLAM: <25 NG/ML
AMPHETAMINE (PRESENCE) IN URINE BY SCREEN METHOD: ABNORMAL
BARBITURATES PRESENCE IN URINE BY SCREEN METHOD: ABNORMAL
CANNABINOIDS IN URINE BY SCREEN METHOD: ABNORMAL
CHLORDIAZEPOXIDE: <25 NG/ML
CLONAZEPAM: <25 NG/ML
COCAINE (PRESENCE) IN URINE BY SCREEN METHOD: ABNORMAL
CODEINE: <50 NG/ML
CREATINE, URINE FOR DRUG: 156.8 MG/DL
DIAZEPAM: <25 NG/ML
DRUG SCREEN COMMENT URINE: ABNORMAL
EDDP: <25 NG/ML
FENTANYL CONFIRMATION, URINE: <2.5 NG/ML
HYDROCODONE: <25 NG/ML
HYDROMORPHONE: <25 NG/ML
LORAZEPAM: <25 NG/ML
METHADONE CONFIRMATION,URINE: <25 NG/ML
MIDAZOLAM: <25 NG/ML
MORPHINE URINE: <50 NG/ML
NORDIAZEPAM: <25 NG/ML
NORFENTANYL: <2.5 NG/ML
NORHYDROCODONE: <25 NG/ML
NOROXYCODONE: <25 NG/ML
O-DESMETHYLTRAMADOL: <50 NG/ML
OXAZEPAM: <25 NG/ML
OXYCODONE: <25 NG/ML
OXYMORPHONE: <25 NG/ML
PHENCYCLIDINE (PRESENCE) IN URINE BY SCREEN METHOD: ABNORMAL
TEMAZEPAM: <25 NG/ML
TRAMADOL: <50 NG/ML
ZOLPIDEM METABOLITE (ZCA): <25 NG/ML
ZOLPIDEM: <25 NG/ML

## 2023-11-06 DIAGNOSIS — F11.90 OPIOID USE DISORDER: Primary | ICD-10-CM

## 2023-11-06 RX ORDER — BUPRENORPHINE 300 MG/1
300 SOLUTION SUBCUTANEOUS
Qty: 1.5 ML | Refills: 0 | Status: SHIPPED | OUTPATIENT
Start: 2023-11-06 | End: 2023-12-04 | Stop reason: SDUPTHER

## 2023-11-08 NOTE — PROGRESS NOTES
Medication Documentation Review Audit       Reviewed by Anastasia Dinero RN (Registered Nurse) on 11/06/23 at 1732      Medication Order Taking? Sig Documenting Provider Last Dose Status   buprenorphine ER (Sublocade) 300 mg/1.5mL injection [id number]  Inject 1.5 mL (1 each) under the skin every month to absorb continually. Aldair Castro DO  Active      Discontinued 11/06/23 1630   buPROPion SR (Wellbutrin SR) 150 mg 12 hr tablet [id number]  Take 1 tablet (150 mg) by mouth 2 times a day. Do not crush, chew, or split. Aldair Castro DO  Active   busPIRone (Buspar) 30 mg tablet [id number]  Take 1 tablet (30 mg) by mouth 2 times a day. Aldair Castro DO  Active   cloNIDine (Catapres) 0.2 mg tablet [id number] No Take 1 tablet (0.2 mg) by mouth 3 times a day. Aldair Castro DO Taking Active   diclofenac sodium (Voltaren) 1 % gel gel [id number]   Historical Provider, MD  Active   DULoxetine (Cymbalta) 60 mg DR capsule [id number]  Take 1 capsule (60 mg) by mouth once daily. Do not crush or chew. Aldair Castro DO  Active   escitalopram (Lexapro) 20 mg tablet [id number]  Take 1 tablet (20 mg) by mouth once daily. Aldair Castro DO  Active   estradiol (Estrace) 1 mg tablet [id number]  Take 1 tablet (1 mg) by mouth once daily. Historical Provider, MD  Active   gabapentin (Neurontin) 300 mg capsule [id number]  Take 1 capsule (300 mg) by mouth 3 times a day. Aldair Castro DO  Active   hydrOXYzine HCL (Atarax) 50 mg tablet [id number]  Take 1 tablet (50 mg) by mouth 3 times a day as needed for anxiety. Aldair Castro DO  Active   loratadine (Claritin) 10 mg tablet [id number] No Take 1 tablet (10 mg) by mouth once daily as needed. Historical Provider, MD Not Taking Active   OLANZapine (ZyPREXA) 5 mg tablet [id number]  Take 1 tablet (5 mg) by mouth once daily at bedtime. Aldair Castro DO  Active   ondansetron ODT (Zofran-ODT) 4 mg disintegrating tablet [id number] No Take 1 tablet (4 mg) by mouth every 8 hours if needed for nausea. Historical Provider, MD Not  Taking Active   pantoprazole (ProtoNix) 40 mg EC tablet [id number] No Take 1 tablet (40 mg) by mouth once daily. Historical Provider, MD Not Taking Active   polyethylene glycol (Glycolax) 17 gram/dose powder [id number] No Take 238 g by mouth. Mix in 64 ounces of Gatorade or Powerade - not red, orange, purple, and use as directed for bowel prep Historical Provider, MD Not Taking Active   QUEtiapine (Seroquel) 200 mg tablet [id number] No Take 1 tablet (200 mg) by mouth once daily at bedtime. Aldair Castro, DO Taking Active   sertraline (Zoloft) 50 mg tablet [id number]   Historical Provider, MD  Active   simethicone (Mylicon) 80 mg chewable tablet [id number] No Chew 1 tablet (80 mg) 4 times a day. After meals and at bedtime Historical Provider, MD Not Taking Active   traZODone (Desyrel) 100 mg tablet [id number]   Historical Provider, MD  Active

## 2023-11-08 NOTE — PROGRESS NOTES
Subjective   Patient ID: Kym Jennings is a 43 y.o. female who presents for Med Management.    HPI     Patient brought in by  from [facility].   Needs Sublocade injection   Has not used illicit drugs or EtOH since our last visit   Does have some constipation, otherwise no side effects      Review of Systems    Objective   There were no vitals taken for this visit.    Physical Exam  Constitutional:       Appearance: Normal appearance.   Pulmonary:      Effort: Pulmonary effort is normal.   Neurological:      Mental Status: She is alert.   Psychiatric:         Mood and Affect: Mood normal.         Behavior: Behavior normal.         Thought Content: Thought content normal.         Assessment/Plan       #OUD  -Sublocade 300mg subcutaneous x 1 given (right abdomen, LOT: [id number], Ex 7/2024)  -Will get UDS next visit   -[facility] 8/31/23  -  I have personally reviewed the OARRS report for . This report is scanned into the electronic medical record. I have considered the risks of abuse, dependence, addiction and diversion.     Patient to follow up in 4 weeks --will repeat 300mg due to length of time after last injection

## 2023-11-09 ENCOUNTER — OFFICE VISIT (OUTPATIENT)
Dept: PRIMARY CARE | Facility: CLINIC | Age: 43
End: 2023-11-09
Payer: MEDICAID

## 2023-11-09 VITALS
OXYGEN SATURATION: 97 % | WEIGHT: 161 LBS | SYSTOLIC BLOOD PRESSURE: 125 MMHG | BODY MASS INDEX: 25.22 KG/M2 | HEART RATE: 83 BPM | DIASTOLIC BLOOD PRESSURE: 95 MMHG

## 2023-11-09 DIAGNOSIS — K59.03 DRUG-INDUCED CONSTIPATION: Primary | ICD-10-CM

## 2023-11-09 PROCEDURE — 99213 OFFICE O/P EST LOW 20 MIN: CPT | Performed by: INTERNAL MEDICINE

## 2023-11-09 RX ORDER — CEPHALEXIN 500 MG/1
500 CAPSULE ORAL 3 TIMES DAILY
COMMUNITY

## 2023-11-09 RX ORDER — DOCUSATE SODIUM 100 MG/1
100 CAPSULE, LIQUID FILLED ORAL 2 TIMES DAILY
Qty: 60 CAPSULE | Refills: 3 | Status: SHIPPED | OUTPATIENT
Start: 2023-11-09

## 2023-12-04 DIAGNOSIS — F11.90 OPIOID USE DISORDER: ICD-10-CM

## 2023-12-04 NOTE — TELEPHONE ENCOUNTER
Requested Prescriptions     Pending Prescriptions Disp Refills    buprenorphine ER (Sublocade) 300 mg/1.5mL injection 1.5 mL 0     Sig: Inject 1.5 mL (1 each) under the skin every month to absorb continually. XIANG:[id number]

## 2023-12-05 RX ORDER — BUPRENORPHINE 300 MG/1
300 SOLUTION SUBCUTANEOUS
Qty: 1.5 ML | Refills: 0 | Status: SHIPPED | OUTPATIENT
Start: 2023-12-05 | End: 2024-01-04 | Stop reason: SDUPTHER

## 2023-12-07 ENCOUNTER — OFFICE VISIT (OUTPATIENT)
Dept: PRIMARY CARE | Facility: CLINIC | Age: 43
End: 2023-12-07
Payer: MEDICAID

## 2023-12-07 VITALS
HEIGHT: 67 IN | BODY MASS INDEX: 25.22 KG/M2 | HEART RATE: 95 BPM | DIASTOLIC BLOOD PRESSURE: 85 MMHG | SYSTOLIC BLOOD PRESSURE: 114 MMHG

## 2023-12-07 DIAGNOSIS — Z72.51 HIGH RISK SEXUAL BEHAVIOR, UNSPECIFIED TYPE: ICD-10-CM

## 2023-12-07 DIAGNOSIS — K59.03 DRUG-INDUCED CONSTIPATION: ICD-10-CM

## 2023-12-07 DIAGNOSIS — R10.84 GENERALIZED ABDOMINAL PAIN: ICD-10-CM

## 2023-12-07 DIAGNOSIS — R30.0 DYSURIA: ICD-10-CM

## 2023-12-07 DIAGNOSIS — F11.90 OPIOID USE DISORDER: Primary | ICD-10-CM
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
## 2023-12-07 PROBLEM — R10.9 ABDOMINAL PAIN: Status: ACTIVE | Noted: 2023-12-07

## 2023-12-07 PROBLEM — F41.9 ANXIETY: Status: ACTIVE | Noted: 2023-12-07

## 2023-12-07 PROBLEM — S99.919A INJURY OF ANKLE: Status: ACTIVE | Noted: 2023-12-07

## 2023-12-07 PROBLEM — F43.9 STRESS: Status: ACTIVE | Noted: 2023-12-07

## 2023-12-07 PROBLEM — I10 DIASTOLIC HYPERTENSION: Status: ACTIVE | Noted: 2023-12-07

## 2023-12-07 PROBLEM — K50.90 CROHN'S DISEASE (MULTI): Status: ACTIVE | Noted: 2023-12-07

## 2023-12-07 PROBLEM — K50.10 EXACERBATION OF CROHN'S DISEASE OF LARGE INTESTINE (MULTI): Status: ACTIVE | Noted: 2023-12-07

## 2023-12-07 PROBLEM — N39.0 ACUTE LOWER URINARY TRACT INFECTION: Status: ACTIVE | Noted: 2023-12-07

## 2023-12-07 LAB
POC APPEARANCE, URINE: CLEAR
POC BILIRUBIN, URINE: NEGATIVE
POC BLOOD, URINE: NEGATIVE
POC COLOR, URINE: YELLOW
POC GLUCOSE, URINE: NEGATIVE MG/DL
POC KETONES, URINE: NEGATIVE MG/DL
POC LEUKOCYTES, URINE: NEGATIVE
POC NITRITE,URINE: NEGATIVE
POC PH, URINE: 6 PH
POC PROTEIN, URINE: NEGATIVE MG/DL
POC SPECIFIC GRAVITY, URINE: 1.02
POC UROBILINOGEN, URINE: 0.2 EU/DL

## 2023-12-07 PROCEDURE — 3074F SYST BP LT 130 MM HG: CPT | Performed by: INTERNAL MEDICINE

## 2023-12-07 PROCEDURE — 87800 DETECT AGNT MULT DNA DIREC: CPT

## 2023-12-07 PROCEDURE — 3079F DIAST BP 80-89 MM HG: CPT | Performed by: INTERNAL MEDICINE

## 2023-12-07 PROCEDURE — 81003 URINALYSIS AUTO W/O SCOPE: CPT | Performed by: INTERNAL MEDICINE

## 2023-12-07 PROCEDURE — 99214 OFFICE O/P EST MOD 30 MIN: CPT | Performed by: INTERNAL MEDICINE

## 2023-12-07 RX ORDER — POLYETHYLENE GLYCOL 3350 17 G/17G
POWDER, FOR SOLUTION ORAL
Qty: 72 PACKET | Refills: 0 | Status: SHIPPED | OUTPATIENT
Start: 2023-12-07

## 2023-12-07 NOTE — PATIENT INSTRUCTIONS
Opioid use disorder  Getting sublocade today 300mg /monthly, and to discuss with Dr. Castro next visit, possibility to go to the films  Urine screen is normal, in office testing  Also discussed with pt dosing per up to date, cannot increase dose and standard dosing is 100/month  Sublocade lot [id number], exp 7/2024    Abdominal pain with history of chronic pancreatitis and crohns  Checking pancreatitis markers,  I think this is all due to constipation  Take miralax 17gm in fluid 3 times today then twice daily, call if no response or if too loose    High risk sexual behavior, checking for hepatitis, syphilis, HIV, gonorrhea and chlamydia (urine)    Recheck in 1 month

## 2023-12-07 NOTE — PROGRESS NOTES
"Subjective   Patient ID: Kym Jennings is a 43 y.o. female who presents for Med Refill.    Has been on sublocade since August,   [facility]  Was on the suboxone  film for many years  She has chronic pancreatitis and it helped with that pain, she would like to go back to the films  She has been off dope and had issues with other drugs, she went off the suboxone film, due to being stolen [facility]  She went without it. So now is on the shot, she feels the shot doesn't cover her. It seems to help for 1 week then the other weeks she feels that it is not helping  She is in senior living currently until April, she is not using any drugs in there or etoh  She is going to [facility] for her psychiatric issues, sees Dr. Moscoso. She is having side effects from her medications  Pt is here in ankle and wrist shackles and wrist were removed for the exam by guard who was present at all times.     Is having some urinary issues. She was permiscuous, and she is worried about syphilis or hepatitis  She was on bactrim for a uti and still had blood in her urine  She has pain when she urinates, and urgency,   She has pain in her bladder, she has 1 ovary and had a full hysterectomy, she is not pregnant  They did a urine for chlamydia  She feels very backed up and constipated, is in the area of her pancreas  Pain when she eats, has to lay on her left side  Her bowels are constipated, has moved her bowels that last time about 4 days ago  She takes dulcusate, not getting the miralax, needs a paper script.   She has hemorrhoids now.   They are bleeding.   She has to strain to move her bowels.     Med Refill         Review of Systems    Objective   /85   Pulse 95   Ht 1.702 m (5' 7\")   BMI 25.22 kg/m²     Physical Exam  Constitutional:       Appearance: Normal appearance.   Cardiovascular:      Rate and Rhythm: Normal rate and regular rhythm.      Heart sounds: No murmur heard.  Abdominal:      Comments: Abdomen " is soft, diffusely tender particularly along course of colon and stool could be palpated along the course to the RUQ, no guarding but rebound noted.   Bs normal.   Skin:     Comments: Firm nodule felt at the RUQ where prior sublocade injected,    Neurological:      Mental Status: She is alert.   Psychiatric:         Mood and Affect: Mood normal.      Comments: Appears anxious         Assessment/Plan          Patient Instructions   Opioid use disorder  Getting sublocade today 300mg /monthly, and to discuss with Dr. Castro next visit, possibility to go to the films  Urine screen is normal    Abdominal pain with history of chronic pancreatitis and crohns  Checking pancreatitis markers,  I think this is all due to constipation  Take miralax 17gm in fluid 3 times today then twice daily, call if no response or if too loose    High risk sexual behavior, checking for hepatitis, syphilis, HIV, gonorrhea and chlamydia (urine)    Recheck in 1 month

## 2023-12-07 NOTE — PROGRESS NOTES
Subjective   Patient ID: Kym Jennings is a 43 y.o. female who presents for Med Refill.    HPI     Review of Systems    Objective   There were no vitals taken for this visit.    Physical Exam    Assessment/Plan

## 2023-12-08 LAB
C TRACH RRNA SPEC QL NAA+PROBE: NEGATIVE
N GONORRHOEA DNA SPEC QL PROBE+SIG AMP: NEGATIVE

## 2023-12-28 ENCOUNTER — HOSPITAL ENCOUNTER (OUTPATIENT)
Dept: RADIOLOGY | Facility: HOSPITAL | Age: 43
Discharge: HOME | End: 2023-12-28
Payer: COMMERCIAL

## 2023-12-28 DIAGNOSIS — R14.0 ABDOMINAL DISTENSION (GASEOUS): ICD-10-CM

## 2023-12-28 PROCEDURE — 74022 RADEX COMPL AQT ABD SERIES: CPT | Performed by: RADIOLOGY

## 2023-12-28 PROCEDURE — 74022 RADEX COMPL AQT ABD SERIES: CPT

## 2024-01-04 ENCOUNTER — APPOINTMENT (OUTPATIENT)
Dept: PRIMARY CARE | Facility: CLINIC | Age: 44
End: 2024-01-04
Payer: COMMERCIAL

## 2024-01-04 DIAGNOSIS — F11.90 OPIOID USE DISORDER: ICD-10-CM

## 2024-01-04 RX ORDER — BUPRENORPHINE 300 MG/1
300 SOLUTION SUBCUTANEOUS
Qty: 1.5 ML | Refills: 0 | Status: SHIPPED | OUTPATIENT
Start: 2024-01-04 | End: 2024-01-29 | Stop reason: ALTCHOICE

## 2024-01-04 NOTE — TELEPHONE ENCOUNTER
Requested Prescriptions     Pending Prescriptions Disp Refills    buprenorphine ER (Sublocade) 300 mg/1.5mL injection 1.5 mL 0     Sig: Inject 1.5 mL (1 each) under the skin every month to absorb continually. XIANG:[id number]

## 2024-01-05 ENCOUNTER — HOSPITAL ENCOUNTER (OUTPATIENT)
Dept: RADIOLOGY | Facility: HOSPITAL | Age: 44
Discharge: HOME | End: 2024-01-05
Payer: COMMERCIAL

## 2024-01-05 ENCOUNTER — OFFICE VISIT (OUTPATIENT)
Dept: PRIMARY CARE | Facility: CLINIC | Age: 44
End: 2024-01-05
Payer: MEDICAID

## 2024-01-05 VITALS
DIASTOLIC BLOOD PRESSURE: 92 MMHG | WEIGHT: 161 LBS | BODY MASS INDEX: 25.27 KG/M2 | HEIGHT: 67 IN | SYSTOLIC BLOOD PRESSURE: 117 MMHG | HEART RATE: 102 BPM

## 2024-01-05 DIAGNOSIS — R91.8 OTHER NONSPECIFIC ABNORMAL FINDING OF LUNG FIELD: ICD-10-CM

## 2024-01-05 DIAGNOSIS — F11.90 OPIOID USE DISORDER: ICD-10-CM

## 2024-01-05 PROCEDURE — 80365 DRUG SCREENING OXYCODONE: CPT

## 2024-01-05 PROCEDURE — 80307 DRUG TEST PRSMV CHEM ANLYZR: CPT

## 2024-01-05 PROCEDURE — 71260 CT THORAX DX C+: CPT | Performed by: RADIOLOGY

## 2024-01-05 PROCEDURE — 80354 DRUG SCREENING FENTANYL: CPT

## 2024-01-05 PROCEDURE — 3074F SYST BP LT 130 MM HG: CPT | Performed by: INTERNAL MEDICINE

## 2024-01-05 PROCEDURE — 80361 OPIATES 1 OR MORE: CPT

## 2024-01-05 PROCEDURE — 2550000001 HC RX 255 CONTRASTS: Performed by: PHYSICIAN ASSISTANT

## 2024-01-05 PROCEDURE — 3080F DIAST BP >= 90 MM HG: CPT | Performed by: INTERNAL MEDICINE

## 2024-01-05 PROCEDURE — 80373 DRUG SCREENING TRAMADOL: CPT

## 2024-01-05 PROCEDURE — 80346 BENZODIAZEPINES1-12: CPT

## 2024-01-05 PROCEDURE — 99213 OFFICE O/P EST LOW 20 MIN: CPT | Performed by: INTERNAL MEDICINE

## 2024-01-05 PROCEDURE — 82570 ASSAY OF URINE CREATININE: CPT

## 2024-01-05 PROCEDURE — 71260 CT THORAX DX C+: CPT

## 2024-01-05 PROCEDURE — 80358 DRUG SCREENING METHADONE: CPT

## 2024-01-05 PROCEDURE — 80368 SEDATIVE HYPNOTICS: CPT

## 2024-01-05 RX ADMIN — IOHEXOL 50 ML: 350 INJECTION, SOLUTION INTRAVENOUS at 10:27

## 2024-01-05 NOTE — PROGRESS NOTES
"Subjective   Patient ID: Kym Jennings is a 43 y.o. female who presents for Med Refill.    Pt here for sublocade injection, has been getting 300mg monthly  She states she has not been having any cravings  She still is having bloating and says she has bacterial overgrowth and needs rifaxamin   Is currently incarcerated. No illicit drug use or alcohol  She wants to know what her xrays show    Med Refill         Review of Systems    Objective   BP (!) 117/92   Pulse 102   Ht 1.702 m (5' 7\")   Wt 73 kg (161 lb)   BMI 25.22 kg/m²     Physical Exam  Constitutional:       Appearance: Normal appearance.   Abdominal:      Comments: Soft nontender,  Has subcut nodule left side of abdominal wall subcut, mobile, not tender  Injection of sublocade 300mg given (monthly) without complication   Neurological:      Mental Status: She is alert.   Psychiatric:         Mood and Affect: Mood normal.         Behavior: Behavior normal.         Assessment/Plan        OUD, on sublocade monthly 300mg  No evidence of withdrawal or sedation  UDS sent  CSA/bup, on 8/31/23  Recheck in 1 month    I have personally reviewed the OARRS report for Kym Jennings. This report is scanned into the electronic medical record. I have considered the risks of abuse, dependence, addiction and diversion.     "

## 2024-01-06 LAB
AMPHETAMINES UR QL SCN: NORMAL
BARBITURATES UR QL SCN: NORMAL
BZE UR QL SCN: NORMAL
CANNABINOIDS UR QL SCN: NORMAL
CREAT UR-MCNC: 105 MG/DL (ref 20–320)
PCP UR QL SCN: NORMAL

## 2024-01-09 LAB
BUPRENORPHINE UR-MCNC: <2 NG/ML
BUPRENORPHINE UR-MCNC: >1000 NG/ML
NALOXONE UR CFM-MCNC: <100 NG/ML
NORBUPRENORPHINE UR CFM-MCNC: 94 NG/ML
NORBUPRENORPHINE UR-MCNC: 19 NG/ML

## 2024-01-29 DIAGNOSIS — F11.90 OPIOID USE DISORDER: ICD-10-CM

## 2024-01-29 DIAGNOSIS — F11.90 OPIOID USE DISORDER: Primary | ICD-10-CM

## 2024-01-29 RX ORDER — BUPRENORPHINE 100 MG/1
1 SOLUTION SUBCUTANEOUS
Qty: 0.5 ML | Refills: 2 | Status: SHIPPED | OUTPATIENT
Start: 2024-01-29 | End: 2024-03-10 | Stop reason: SDUPTHER

## 2024-01-29 NOTE — TELEPHONE ENCOUNTER
Requested Prescriptions     Pending Prescriptions Disp Refills    buprenorphine ER (Sublocade) 300 mg/1.5mL injection 1.5 mL 0     Sig: Inject 1.5 mL (1 each) under the skin every month to absorb continually. XIANG:[id number]

## 2024-01-31 RX ORDER — BUPRENORPHINE 300 MG/1
300 SOLUTION SUBCUTANEOUS
Qty: 1.5 ML | Refills: 0 | OUTPATIENT
Start: 2024-01-31 | End: 2024-03-31

## 2024-02-06 ENCOUNTER — OFFICE VISIT (OUTPATIENT)
Dept: GASTROENTEROLOGY | Facility: HOSPITAL | Age: 44
End: 2024-02-06
Payer: COMMERCIAL

## 2024-02-06 VITALS
WEIGHT: 196 LBS | SYSTOLIC BLOOD PRESSURE: 109 MMHG | RESPIRATION RATE: 18 BRPM | BODY MASS INDEX: 29.03 KG/M2 | HEART RATE: 99 BPM | HEIGHT: 69 IN | OXYGEN SATURATION: 95 % | TEMPERATURE: 97.7 F | DIASTOLIC BLOOD PRESSURE: 69 MMHG

## 2024-02-06 DIAGNOSIS — K50.019 CROHN'S DISEASE OF SMALL INTESTINE WITH COMPLICATION (MULTI): Primary | ICD-10-CM

## 2024-02-06 DIAGNOSIS — K50.114: ICD-10-CM

## 2024-02-06 PROCEDURE — 3078F DIAST BP <80 MM HG: CPT | Performed by: INTERNAL MEDICINE

## 2024-02-06 PROCEDURE — 3074F SYST BP LT 130 MM HG: CPT | Performed by: INTERNAL MEDICINE

## 2024-02-06 PROCEDURE — 99214 OFFICE O/P EST MOD 30 MIN: CPT | Performed by: INTERNAL MEDICINE

## 2024-02-06 RX ORDER — BUDESONIDE 3 MG/1
CAPSULE, COATED PELLETS ORAL
Qty: 90 CAPSULE | Refills: 11 | Status: SHIPPED | OUTPATIENT
Start: 2024-02-06

## 2024-02-06 RX ORDER — METHYLPREDNISOLONE 4 MG/1
TABLET ORAL
Qty: 21 TABLET | Refills: 0 | Status: SHIPPED | OUTPATIENT
Start: 2024-02-06 | End: 2024-02-13

## 2024-02-06 RX ORDER — CIPROFLOXACIN 500 MG/1
500 TABLET ORAL 2 TIMES DAILY
Qty: 20 TABLET | Refills: 0 | Status: SHIPPED | OUTPATIENT
Start: 2024-02-06 | End: 2024-02-16

## 2024-02-06 RX ORDER — METRONIDAZOLE 500 MG/1
500 TABLET ORAL 2 TIMES DAILY
Qty: 20 TABLET | Refills: 0 | Status: SHIPPED | OUTPATIENT
Start: 2024-02-06 | End: 2024-02-16

## 2024-02-06 SDOH — ECONOMIC STABILITY: FOOD INSECURITY: WITHIN THE PAST 12 MONTHS, THE FOOD YOU BOUGHT JUST DIDN'T LAST AND YOU DIDN'T HAVE MONEY TO GET MORE.: NEVER TRUE

## 2024-02-06 SDOH — ECONOMIC STABILITY: FOOD INSECURITY: WITHIN THE PAST 12 MONTHS, YOU WORRIED THAT YOUR FOOD WOULD RUN OUT BEFORE YOU GOT MONEY TO BUY MORE.: NEVER TRUE

## 2024-02-06 ASSESSMENT — COLUMBIA-SUICIDE SEVERITY RATING SCALE - C-SSRS
6. HAVE YOU EVER DONE ANYTHING, STARTED TO DO ANYTHING, OR PREPARED TO DO ANYTHING TO END YOUR LIFE?: NO
1. IN THE PAST MONTH, HAVE YOU WISHED YOU WERE DEAD OR WISHED YOU COULD GO TO SLEEP AND NOT WAKE UP?: NO
2. HAVE YOU ACTUALLY HAD ANY THOUGHTS OF KILLING YOURSELF?: NO

## 2024-02-06 ASSESSMENT — ENCOUNTER SYMPTOMS
OCCASIONAL FEELINGS OF UNSTEADINESS: 0
LOSS OF SENSATION IN FEET: 0
DEPRESSION: 0

## 2024-02-06 ASSESSMENT — PATIENT HEALTH QUESTIONNAIRE - PHQ9
2. FEELING DOWN, DEPRESSED OR HOPELESS: NOT AT ALL
SUM OF ALL RESPONSES TO PHQ9 QUESTIONS 1 AND 2: 0
1. LITTLE INTEREST OR PLEASURE IN DOING THINGS: NOT AT ALL

## 2024-02-06 ASSESSMENT — PAIN SCALES - GENERAL: PAINLEVEL: 6

## 2024-02-06 NOTE — PROGRESS NOTES
"Department of Gastroenterology and Hepatolology Clinic Note      HPI  Kym Jennings is a 43 y.o. female with PMH of stricturing ileal Crohn's disease (Dx age 26, s/p Humira, Remicade, Cimzia, Entyvio, and Stelara, currently on Skyrizi since 10/2022) s/p multiple bowel resections (last resection in 2017), medical nonadherence, chronic pancreatitis s/p stent, opioid dependence, tobacco dependence, depression/anxiety and PTSD who presents to IBD clinic to ? establish care.    Patient was diagnosed with Crohn's disease in 2007. She underwent ileocolectomy and SBR due to stricturing complications in 2008 prior to any biologics therapy. She was started on adalimumab for post-op CD recurrence. She was lost to follow-up until 2011 when she developed GI symptoms and had been off adalimumab for several years. She was then placed on infliximab with periods of medication non-adherence in between.  Self reports severe pain from the injection site and malaise with Humira and \" ineffectiveness\" of Remicade; medical note also indicated patient developed staph infection of her port with Remicade.    There was a gap from 2013 to 2018 when she was lost to follow-up until she presented to GI clinic in 2018 (Dr. Mateus Allred) with RUQ pain and postprandial emesis and started Cimzia 5/2018 but self discontinued. Note 8/2018 was planning to start Stelara but lost to follow-up afterwards.    She then saw Dr. Rm Amor in 4/2019 after a CT flare required prednisone taper.  At the time and was started on vedo. She took vedolizumab for a year but then self stopped during the pandemic (last dose was March 2020) and reports Vedo did not seem to improve his symptoms.    Since 2020, she has had multiple hospitalizations for pSBO due to active CD with SB strictures.  Colonoscopy on 8/21/20 showed inflammation in the guicho-TI and otherwise nl exam.     CTAP on 2/6/22 showed long segment of guicho-TI with stricturing and mucosal hyperenhancement " concerning for skipped strictures. MRE on 2/14/22 showed stricture with active inflammation involving the guicho-TI resulting in mild dilatation of the small bowel proximal to it. She was started on prednisone course and received her first Stelara infusion on 3/29/22.     Patient initially responded well to Stelara. However, she was admitted on 8/10-8/14/22 to [facility] with worsening abd bloating. CTAP on 8/4/22 showed redundant colon with prominent gaseous distension with tapering at the sigmoid colon with pseudo pneumatosis colitis involving the R colon and ileitis.  Colonoscopy on 8/12/22 which showed congested friable mucosa with small ulcers in the guicho-TI consistent with Rutgeert's i3 and one sigmoid hyperplastic polyp.  FCP  8/2022 was 57.  At that time, she was living at the [facility] and takes Suboxone daily for drug rehab.     Saw Dr. Apoorva Light in fellows' clinic  in 3/2022 and then briefly followed with Dr. Hermilo Brown from 5/2022-10/2022 then lost to follow-up.  During her most recent visit she bdominal bloating with alternating diarrhea/constipation likely due to multifactorial etiologies (narcotic bowel syndrome and/or SIBO).  Patient was about to start Skyrizi during last visit with Dr. Brown.     Patient was admitted to [facility] in 12/2022 for suicidal ideation. Seen by Dr. Luz's team for abd pain thought to be a combination IBD, IBS and SIBO.     CT chest 1/2024 calcified granuloma in the left lower lobe, Markedly heterogeneous sclerotic appearance of the skeleton which is nonspecific and may relate to benign entity such as renal osteodystrophy. However, metastatic disease is not excluded.    Labs 12/12/23: hgb 11.6 with MCV 96, albumin 3, mildly elevated     In clinic today, patient is currently incarcerated at [facility] and will be released in 2 months (April 2024). She reports only got 3 doses of Skyrizi and has not had any Skyrizi for over a year. She thought it helped.  Also  thinks Xifaxan helped with abdominal bloating.      Patient noticed a perianal abscess 1 wk ago that erupted 2 days ago with drainage. She reports RLQ pain started 2 months ago, treated with a predinsone with 12 day taper (-24) with improvement of symptoms but pain worsened after finishing the taper. Also diffuse abd discomfort with gassiness and belatedness, 4-5 soft formed BM daily with urgency and intermittent small amount of blood in stools the past 2 months, waking up at least twice a night.     EIMs--Diffuse joint pain (knees, ankles, wrists), no eye pain/redness, some ithchy reddish bumps on hands and arms, no oral ulcers       ROS: All 10 systems reviewed are negative unless mentioned in HPI.     PMH: As above  PSH:   Past Surgical History:   Procedure Laterality Date    OTHER SURGICAL HISTORY  2022    Ovarian cystectomy    OTHER SURGICAL HISTORY  2022    Esophagogastroduodenoscopy    OTHER SURGICAL HISTORY  2022    Colonoscopy    OTHER SURGICAL HISTORY  2022    Small bowel resection    OTHER SURGICAL HISTORY  2022    Hysterectomy    OTHER SURGICAL HISTORY  2022     section     FH:   Family History   Problem Relation Name Age of Onset    Clotting disorder Father      Diabetes Other Paternal Relatives     Clotting disorder Other Paternal Relatives       SH:   Social History     Socioeconomic History    Marital status: Single     Spouse name: Not on file    Number of children: Not on file    Years of education: Not on file    Highest education level: Not on file   Occupational History    Not on file   Tobacco Use    Smoking status: Every Day     Packs/day: 1.00     Years: 20.00     Additional pack years: 0.00     Total pack years: 20.00     Types: Cigarettes    Smokeless tobacco: Never   Vaping Use    Vaping Use: Never used   Substance and Sexual Activity    Alcohol use: Not Currently    Drug use: Not Currently     Types: Methamphetamines, Amphetamines,  Marijuana    Sexual activity: Not Currently   Other Topics Concern    Not on file   Social History Narrative    Not on file     Social Determinants of Health     Financial Resource Strain: Not on file   Food Insecurity: No Food Insecurity (2/6/2024)    Hunger Vital Sign     Worried About Running Out of Food in the Last Year: Never true     Ran Out of Food in the Last Year: Never true   Transportation Needs: Not on file   Physical Activity: Not on file   Stress: Not on file   Social Connections: Not on file   Intimate Partner Violence: Not on file   Housing Stability: Not on file     Home meds:   Current Outpatient Medications   Medication Sig Dispense Refill    aripiprazole (ABILIFY MYCITE ORAL) Take by mouth.      buprenorphine ER (Sublocade) 100 mg/0.5mL injection Inject 0.5 mL (1 each) under the skin every month to absorb continually. 0.5 mL 2    buPROPion SR (Wellbutrin SR) 150 mg 12 hr tablet Take 1 tablet (150 mg) by mouth 2 times a day. Do not crush, chew, or split. 180 tablet 0    busPIRone (Buspar) 30 mg tablet Take 1 tablet (30 mg) by mouth 2 times a day. 180 tablet 0    cephalexin (Keflex) 500 mg capsule Take 1 capsule (500 mg) by mouth 3 times a day.      cloNIDine (Catapres) 0.2 mg tablet Take 1 tablet (0.2 mg) by mouth 3 times a day. 270 tablet 0    diclofenac sodium (Voltaren) 1 % gel gel       docusate sodium (Colace) 100 mg capsule Take 1 capsule (100 mg) by mouth 2 times a day. 60 capsule 3    DULoxetine (Cymbalta) 60 mg DR capsule Take 1 capsule (60 mg) by mouth once daily. Do not crush or chew. 30 capsule 3    escitalopram (Lexapro) 20 mg tablet Take 1 tablet (20 mg) by mouth once daily. 90 tablet 0    estradiol (Estrace) 1 mg tablet Take 1 tablet (1 mg) by mouth once daily.      gabapentin (Neurontin) 300 mg capsule Take 1 capsule (300 mg) by mouth 3 times a day. 90 capsule 0    hydrOXYzine HCL (Atarax) 50 mg tablet Take 1 tablet (50 mg) by mouth 3 times a day as needed for anxiety. 270 tablet 0  "   loratadine (Claritin) 10 mg tablet Take 1 tablet (10 mg) by mouth once daily as needed.      OLANZapine (ZyPREXA) 5 mg tablet Take 1 tablet (5 mg) by mouth once daily at bedtime. 90 tablet 0    ondansetron ODT (Zofran-ODT) 4 mg disintegrating tablet Take 1 tablet (4 mg) by mouth every 8 hours if needed for nausea.      pantoprazole (ProtoNix) 40 mg EC tablet Take 1 tablet (40 mg) by mouth once daily.      polyethylene glycol (Glycolax, Miralax) 17 gram packet Mix 1 cap (17g) into 8 ounces of fluid, take 3 times a day for 1 day then twice daily for constipation 72 packet 0    QUEtiapine (Seroquel) 200 mg tablet Take 1 tablet (200 mg) by mouth once daily at bedtime. 90 tablet 0    simethicone (Mylicon) 80 mg chewable tablet Chew 1 tablet (80 mg) 4 times a day. After meals and at bedtime      traZODone (Desyrel) 100 mg tablet        No current facility-administered medications for this visit.     Allergies:   Allergies   Allergen Reactions    Metoclopramide Hcl Unknown    Morphine Unknown    Vancomycin Unknown        VS: /69   Pulse 99   Temp 36.5 °C (97.7 °F)   Resp 18   Ht 1.753 m (5' 9\")   Wt 88.9 kg (196 lb)   SpO2 95%   BMI 28.94 kg/m²     PE:  GEN: Sitting in chair comfortably, NAD.  Patient shackled at wrists and legs in the office with 's deputies present.  NEURO: A&O x3   HEENT: No scleral icterus   CV: Distant heard sounds, RRR, no obvious m/r/g   PULM: LCTA, no wheezing/rhonchi/crackles   ABD: Soft, Tenderness all 4Qs worst RLQ, abdomen seems distended, but soft.  +BS; Rectal- consent obtained; chaperone present.  Small chronic appearing old posterior perianal scar/fistula site w/o erythema/induration/active drainage; minimally tender  : No suprapubic/CVA tenderness   EXT: No edema in LES   SKIN: Small red bumps on kunckle areas     LABS:  Lab Results   Component Value Date    WBC 5.2 12/12/2023    WBC 6.1 12/20/2022    WBC 8.1 12/07/2022    WBC 11.4 (H) 09/06/2022    WBC 14.3 (H) " 08/12/2022    HGB 11.6 (L) 12/12/2023    HGB 12.4 12/20/2022    HGB 14.0 12/07/2022    HGB 12.8 09/06/2022    HGB 12.1 08/12/2022    HCT 35.7 (L) 12/12/2023    HCT 36.9 12/20/2022    HCT 40.2 12/07/2022    HCT 36.7 09/06/2022    HCT 36.6 08/12/2022    MCV 96 12/12/2023    MCV 94 12/20/2022    MCV 92 12/07/2022    MCV 91 09/06/2022    MCV 95 08/12/2022     12/12/2023     12/20/2022     12/07/2022     09/06/2022     08/12/2022      Lab Results   Component Value Date    CREATININE 0.69 12/12/2023    CREATININE 0.73 12/20/2022    CREATININE 0.63 12/11/2022    BUN 9 12/12/2023    BUN 8 12/20/2022    BUN 10 12/11/2022     12/12/2023     12/20/2022     12/11/2022    K 4.2 12/12/2023    K 4.1 12/20/2022    K 3.7 12/11/2022     12/12/2023     12/20/2022     12/11/2022    CO2 27 12/12/2023    CO2 25 12/20/2022    CO2 29 12/11/2022      Lab Results   Component Value Date    INR 0.8 (L) 09/02/2020    INR 1.0 08/20/2020    INR 1.0 08/19/2020    PROTIME 9.5 (L) 09/02/2020    PROTIME 11.1 08/20/2020    PROTIME 11.5 08/19/2020      Lab Results   Component Value Date    ALT 11 12/12/2023    ALT 24 12/07/2022    ALT 18 09/06/2022    AST 14 12/12/2023    AST 26 12/07/2022    AST 21 09/06/2022    ALKPHOS 120 (H) 12/12/2023    ALKPHOS 79 12/07/2022    ALKPHOS 64 09/06/2022    BILITOT 0.6 12/12/2023    BILITOT 0.6 12/07/2022    BILITOT 0.4 09/06/2022     No results found for this or any previous visit (from the past 96 hour(s)).      IMAGING:   No results found.     Assessment:   #Crohn's disease-known recurrent ileal disease status post ileocolonic resection.  At last evaluation, this was Rutgeerts i3.  She now has symptoms of mild disease activity with abdominal discomfort, diarrhea, and bloating.  Some of this may also be SIBO.  We will go ahead and give her a Medrol Dosepak and start a 3-month budesonide taper.  This should help her feel better while she remains  incarcerated.    She did feel improved on Skyrizi and once she is released from long term, we could reinitiate this therapy.    # Possible perianal abscess-there is no active inflammation at this time, but we will go ahead and give her a 10-day course of ciprofloxacin and metronidazole, stopping the recently prescribed Keflex.  This should help prevent any worsening perianal disease and will also be effective for any small intestinal bacterial overgrowth that is present.    Plan  1) check labs at a [facility] laboratory as ordered  2) begin ciprofloxacin 500 mg twice daily and metronidazole 500 mg twice daily for 10 days.  Take these antibiotics with a small amount of food.  Also, take these in place of the recently prescribed Keflex  3) begin a Medrol Dosepak and complete further directions  4) at the same time as starting the Medrol Dosepak, begin budesonide (Entocort) 3 capsules once daily in the morning for 1 month and then lower by 1 capsule every month until off budesonide  5) follow-up at [facility] with Dr. Hermilo Brown in late April, 2024  6) when you are released from long term, call our nurse, Dayan Cleaning RN, at [phone] and let her know, so that we can get you restarted on Skyrizi    Tej Rivera MD (Gi fellow, PGY 6)    ATTENDING:  I saw and evaluated the patient.  I personally obtained the key and critical portions of the history and physical exam or was physically present for key and critical portions performed by the resident/fellow.  I reviewed the resident/fellow's documentation and discussed the patient with the resident/fellow.  I agree with the resident/fellow's medical decision making as documented in the note. See edited comments above.  Will treat small perianal abscess (drained spontaneously), treat for suspected recurrent ileal Crohn's disease, and plan for reinitiation of Skyrizi once patient released from long term.

## 2024-02-06 NOTE — PATIENT INSTRUCTIONS
As we discussed today, we will go ahead and treat the small perianal abscess that has spontaneously drained with 10 days of antibiotics.  This should also help treat any small intestinal bacterial overgrowth that may be contributing to your abdominal distention and abdominal discomfort.  Additionally, since we know that you have some recurrent Crohn's disease in these small intestine (neoterminal ileum) we will go ahead and give you a course of budesonide treatment.  Once you are released from snf, we will go ahead and reinitiate therapy with Skyrizi.  As reviewed today:    1) check labs at a [facility] laboratory as ordered  2) begin ciprofloxacin 500 mg twice daily and metronidazole 500 mg twice daily for 10 days.  Take these antibiotics with a small amount of food.  Also, take these in place of the recently prescribed Keflex  3) begin a Medrol Dosepak and complete further directions  4) at the same time as starting the Medrol Dosepak, begin budesonide (Entocort) 3 capsules once daily in the morning for 1 month and then lower by 1 capsule every month until off budesonide  5) follow-up at [facility] with Dr. Hermilo Brown in late April, 2024  6) when you are released from snf, call our nurse, Dayan Cleaning RN, at [phone] and let her know, so that we can get you restarted on Skyrizi

## 2024-02-07 ENCOUNTER — OFFICE VISIT (OUTPATIENT)
Dept: PRIMARY CARE | Facility: CLINIC | Age: 44
End: 2024-02-07
Payer: COMMERCIAL

## 2024-02-07 VITALS
OXYGEN SATURATION: 96 % | WEIGHT: 195 LBS | HEART RATE: 103 BPM | DIASTOLIC BLOOD PRESSURE: 72 MMHG | SYSTOLIC BLOOD PRESSURE: 118 MMHG | BODY MASS INDEX: 28.8 KG/M2

## 2024-02-07 DIAGNOSIS — F11.90 OPIOID USE DISORDER: Primary | ICD-10-CM

## 2024-02-07 PROCEDURE — 3074F SYST BP LT 130 MM HG: CPT | Performed by: INTERNAL MEDICINE

## 2024-02-07 PROCEDURE — 3078F DIAST BP <80 MM HG: CPT | Performed by: INTERNAL MEDICINE

## 2024-02-07 PROCEDURE — 99213 OFFICE O/P EST LOW 20 MIN: CPT | Performed by: INTERNAL MEDICINE

## 2024-02-07 NOTE — PROGRESS NOTES
Subjective   Patient ID: Kym Jennings is a 43 y.o. female who presents for Med Management.    HPI     Patient brought in by  from [facility].   Needs Sublocade injection   Has not used illicit drugs or EtOH since our last visit   No side effects     Review of Systems   All other systems reviewed and are negative.      Objective   There were no vitals taken for this visit.    Physical Exam  Constitutional:       Appearance: Normal appearance.   Pulmonary:      Effort: Pulmonary effort is normal.   Neurological:      Mental Status: She is alert.   Psychiatric:         Mood and Affect: Mood normal.         Behavior: Behavior normal.         Thought Content: Thought content normal.         Assessment/Plan       #OUD  -Sublocade 300mg subcutaneous x 1 given (right abdomen, LOT: [id number], Ex 2/2025)  -UDS:1/5/24  -CSA 8/31/23  -  I have personally reviewed the OARRS report for . This report is scanned into the electronic medical record. I have considered the risks of abuse, dependence, addiction and diversion.     Patient to follow up in 4 weeks

## 2024-03-06 ENCOUNTER — APPOINTMENT (OUTPATIENT)
Dept: PRIMARY CARE | Facility: CLINIC | Age: 44
End: 2024-03-06
Payer: COMMERCIAL

## 2024-03-10 DIAGNOSIS — F11.90 OPIOID USE DISORDER: ICD-10-CM

## 2024-03-10 NOTE — TELEPHONE ENCOUNTER
Requested Prescriptions     Pending Prescriptions Disp Refills    buprenorphine ER (Sublocade) 100 mg/0.5mL injection 0.5 mL 0     Sig: Inject 0.5 mL (1 each) under the skin every month to absorb continually.

## 2024-03-10 NOTE — PROGRESS NOTES
Subjective   Patient ID: Kym Jennings is a 43 y.o. female who presents for Med Management.    HPI     Patient brought in by  from [facility].   Needs Sublocade injection   Has not used illicit drugs or EtOH since our last visit   No side effects     Review of Systems   All other systems reviewed and are negative.      Objective   /76   Pulse 99   Wt 91.2 kg (201 lb)   SpO2 95%   BMI 29.68 kg/m²     Physical Exam  Constitutional:       Appearance: Normal appearance.   Pulmonary:      Effort: Pulmonary effort is normal.   Neurological:      Mental Status: She is alert.   Psychiatric:         Mood and Affect: Mood normal.         Behavior: Behavior normal.         Thought Content: Thought content normal.         Assessment/Plan       #OUD  -Sublocade 100mg subcutaneous x 1 given (right abdomen, LOT:[id number], Ex 3/2025)  -UDS:1/5/24  -CSA 8/31/23  -  I have personally reviewed the OARRS report for . This report is scanned into the electronic medical record. I have considered the risks of abuse, dependence, addiction and diversion.     Patient to follow up in 4 weeks

## 2024-03-11 RX ORDER — BUPRENORPHINE 100 MG/1
1 SOLUTION SUBCUTANEOUS
Qty: 0.5 ML | Refills: 0 | Status: SHIPPED | OUTPATIENT
Start: 2024-03-11 | End: 2024-04-16 | Stop reason: SDUPTHER

## 2024-03-14 ENCOUNTER — OFFICE VISIT (OUTPATIENT)
Dept: PRIMARY CARE | Facility: CLINIC | Age: 44
End: 2024-03-14
Payer: COMMERCIAL

## 2024-03-14 VITALS
WEIGHT: 201 LBS | OXYGEN SATURATION: 95 % | HEART RATE: 99 BPM | BODY MASS INDEX: 29.68 KG/M2 | SYSTOLIC BLOOD PRESSURE: 101 MMHG | DIASTOLIC BLOOD PRESSURE: 76 MMHG

## 2024-03-14 DIAGNOSIS — F11.90 OPIOID USE DISORDER: Primary | ICD-10-CM

## 2024-03-14 PROCEDURE — 3078F DIAST BP <80 MM HG: CPT | Performed by: INTERNAL MEDICINE

## 2024-03-14 PROCEDURE — 3074F SYST BP LT 130 MM HG: CPT | Performed by: INTERNAL MEDICINE

## 2024-03-14 PROCEDURE — 99213 OFFICE O/P EST LOW 20 MIN: CPT | Performed by: INTERNAL MEDICINE

## 2024-04-16 DIAGNOSIS — F11.90 OPIOID USE DISORDER: ICD-10-CM

## 2024-04-17 RX ORDER — BUPRENORPHINE 100 MG/1
1 SOLUTION SUBCUTANEOUS
Qty: 0.5 ML | Refills: 0 | Status: SHIPPED | OUTPATIENT
Start: 2024-04-17 | End: 2024-10-14

## 2024-05-15 ENCOUNTER — OFFICE VISIT (OUTPATIENT)
Dept: PRIMARY CARE | Facility: CLINIC | Age: 44
End: 2024-05-15
Payer: MEDICAID

## 2024-05-15 NOTE — PROGRESS NOTES
Subjective   Patient ID: Kym Jennings is a 43 y.o. female who presents for Med Management.    HPI     Review of Systems    Objective   There were no vitals taken for this visit.    Physical Exam    Assessment/Plan   {Assess/PlanSmartLinks:63451}

## 2024-09-10 DIAGNOSIS — F41.9 ANXIETY AND DEPRESSION: ICD-10-CM

## 2024-09-10 DIAGNOSIS — F32.A ANXIETY AND DEPRESSION: ICD-10-CM

## 2024-09-10 DIAGNOSIS — M79.10 MYALGIA: ICD-10-CM

## 2024-09-11 RX ORDER — GABAPENTIN 300 MG/1
300 CAPSULE ORAL 3 TIMES DAILY
Qty: 30 CAPSULE | Refills: 0 | Status: SHIPPED | OUTPATIENT
Start: 2024-09-11

## 2024-09-11 RX ORDER — CLONIDINE HYDROCHLORIDE 0.2 MG/1
0.2 TABLET ORAL 3 TIMES DAILY
Qty: 90 TABLET | Refills: 0 | Status: SHIPPED | OUTPATIENT
Start: 2024-09-11

## 2024-09-20 ENCOUNTER — APPOINTMENT (OUTPATIENT)
Dept: PRIMARY CARE | Facility: CLINIC | Age: 44
End: 2024-09-20
Payer: MEDICAID

## 2024-09-24 ENCOUNTER — TELEPHONE (OUTPATIENT)
Dept: PRIMARY CARE | Facility: CLINIC | Age: 44
End: 2024-09-24
Payer: MEDICAID

## 2024-09-24 NOTE — TELEPHONE ENCOUNTER
No answer no lizamil   No current mailing address   Patient is discharged from practice due to non compliance

## 2024-09-24 NOTE — TELEPHONE ENCOUNTER
----- Message from Kiarra FINLEY sent at 9/20/2024 11:50 AM EDT -----  Regarding: Patient  Pt has No showed 5 times to practice for appointments. Doctor has decided to discharge patient from practice.

## 2025-01-03 ENCOUNTER — HOSPITAL ENCOUNTER (EMERGENCY)
Facility: HOSPITAL | Age: 45
Discharge: HOME | End: 2025-01-03
Payer: MEDICAID

## 2025-01-03 VITALS
DIASTOLIC BLOOD PRESSURE: 88 MMHG | WEIGHT: 145 LBS | TEMPERATURE: 97.9 F | HEART RATE: 94 BPM | SYSTOLIC BLOOD PRESSURE: 130 MMHG | BODY MASS INDEX: 21.98 KG/M2 | OXYGEN SATURATION: 96 % | RESPIRATION RATE: 20 BRPM | HEIGHT: 68 IN

## 2025-01-03 DIAGNOSIS — L73.9 FOLLICULITIS: Primary | ICD-10-CM

## 2025-01-03 PROCEDURE — 99283 EMERGENCY DEPT VISIT LOW MDM: CPT

## 2025-01-03 RX ORDER — DOXYCYCLINE 100 MG/1
100 TABLET ORAL 2 TIMES DAILY
Qty: 20 TABLET | Refills: 0 | Status: SHIPPED | OUTPATIENT
Start: 2025-01-03 | End: 2025-01-13

## 2025-01-03 ASSESSMENT — PAIN DESCRIPTION - LOCATION: LOCATION: GENERALIZED

## 2025-01-03 ASSESSMENT — COLUMBIA-SUICIDE SEVERITY RATING SCALE - C-SSRS
2. HAVE YOU ACTUALLY HAD ANY THOUGHTS OF KILLING YOURSELF?: NO
6. HAVE YOU EVER DONE ANYTHING, STARTED TO DO ANYTHING, OR PREPARED TO DO ANYTHING TO END YOUR LIFE?: NO
1. IN THE PAST MONTH, HAVE YOU WISHED YOU WERE DEAD OR WISHED YOU COULD GO TO SLEEP AND NOT WAKE UP?: NO

## 2025-01-03 ASSESSMENT — PAIN - FUNCTIONAL ASSESSMENT: PAIN_FUNCTIONAL_ASSESSMENT: 0-10

## 2025-01-03 NOTE — ED PROVIDER NOTES
Emergency Department Encounter  [facility] EMERGENCY MEDICINE    Patient: Kym Jennings  MRN: [id number]  : 1980  Date of Evaluation: 1/3/2025  ED Provider: ODESSA Majano      Chief Complaint       Chief Complaint   Patient presents with    Rash     [place]    (Location/Symptom, Timing/Onset, Context/Setting, Quality, Duration, Modifying Factors, Severity) Note limiting factors.   Limitations to History: none  Historian: self  Records reviewed: EMR inpatient and outpatient notes, Care Everywhere      Kym Jennings is a 44 y.o. female who presents to the emergency department complaining of Concerns for cellulitis, reports redness to the left ear, with areas of erythema to posterior upper thighs, right chest, no fevers, chills, does report that she has had cellulitis in the past.  Areas have been popping up for the last few weeks and states that she feels like she might have cellulitis again.  Denies any exposures to new soaps, lotions, detergents.  Denies any chest pain, shortness of breath, abdominal pain, nausea, vomiting.    ROS:     Review of Systems  14 systems reviewed and otherwise acutely negative except as in the [place].          Past History     Past Medical History:   Diagnosis Date    Crohn's disease (Multi)     Right lower quadrant pain 2022    Abdominal pain, RLQ (right lower quadrant)     Past Surgical History:   Procedure Laterality Date    OTHER SURGICAL HISTORY  2022    Ovarian cystectomy    OTHER SURGICAL HISTORY  2022    Esophagogastroduodenoscopy    OTHER SURGICAL HISTORY  2022    Colonoscopy    OTHER SURGICAL HISTORY  2022    Small bowel resection    OTHER SURGICAL HISTORY  2022    Hysterectomy    OTHER SURGICAL HISTORY  2022     section     Social History     Socioeconomic History    Marital status: Single   Tobacco Use    Smoking status: Every Day     Current packs/day: 1.00     Average packs/day: 1 pack/day for  20.0 years (20.0 ttl pk-yrs)     Types: Cigarettes    Smokeless tobacco: Never   Vaping Use    Vaping status: Never Used   Substance and Sexual Activity    Alcohol use: Not Currently    Drug use: Not Currently     Types: Methamphetamines, Amphetamines, Marijuana    Sexual activity: Not Currently     Social Drivers of Health     Food Insecurity: No Food Insecurity (2/6/2024)    Hunger Vital Sign     Worried About Running Out of Food in the Last Year: Never true     Ran Out of Food in the Last Year: Never true       Medications/Allergies     Previous Medications    ARIPIPRAZOLE (ABILIFY MYCITE ORAL)    Take by mouth.    BUDESONIDE EC (ENTOCORT EC) 3 MG 24 HR CAPSULE    3 capsules once daily in the morning for 1 month and then lower by 1 capsule every month until off budesonide    BUPROPION SR (WELLBUTRIN SR) 150 MG 12 HR TABLET    Take 1 tablet (150 mg) by mouth 2 times a day. Do not crush, chew, or split.    BUSPIRONE (BUSPAR) 30 MG TABLET    Take 1 tablet (30 mg) by mouth 2 times a day.    CEPHALEXIN (KEFLEX) 500 MG CAPSULE    Take 1 capsule (500 mg) by mouth 3 times a day.    CLONIDINE (CATAPRES) 0.2 MG TABLET    Take 1 tablet (0.2 mg) by mouth 3 times a day.    DICLOFENAC SODIUM (VOLTAREN) 1 % GEL GEL        DOCUSATE SODIUM (COLACE) 100 MG CAPSULE    Take 1 capsule (100 mg) by mouth 2 times a day.    DULOXETINE (CYMBALTA) 60 MG DR CAPSULE    Take 1 capsule (60 mg) by mouth once daily. Do not crush or chew.    ESCITALOPRAM (LEXAPRO) 20 MG TABLET    Take 1 tablet (20 mg) by mouth once daily.    ESTRADIOL (ESTRACE) 1 MG TABLET    Take 1 tablet (1 mg) by mouth once daily.    GABAPENTIN (NEURONTIN) 300 MG CAPSULE    Take 1 capsule (300 mg) by mouth 3 times a day.    HYDROXYZINE HCL (ATARAX) 50 MG TABLET    Take 1 tablet (50 mg) by mouth 3 times a day as needed for anxiety.    LORATADINE (CLARITIN) 10 MG TABLET    Take 1 tablet (10 mg) by mouth once daily as needed.    OLANZAPINE (ZYPREXA) 5 MG TABLET    Take 1 tablet  (5 mg) by mouth once daily at bedtime.    ONDANSETRON ODT (ZOFRAN-ODT) 4 MG DISINTEGRATING TABLET    Take 1 tablet (4 mg) by mouth every 8 hours if needed for nausea.    PANTOPRAZOLE (PROTONIX) 40 MG EC TABLET    Take 1 tablet (40 mg) by mouth once daily.    POLYETHYLENE GLYCOL (GLYCOLAX, MIRALAX) 17 GRAM PACKET    Mix 1 cap (17g) into 8 ounces of fluid, take 3 times a day for 1 day then twice daily for constipation    QUETIAPINE (SEROQUEL) 200 MG TABLET    Take 1 tablet (200 mg) by mouth once daily at bedtime.    SIMETHICONE (MYLICON) 80 MG CHEWABLE TABLET    Chew 1 tablet (80 mg) 4 times a day. After meals and at bedtime    TRAZODONE (DESYREL) 100 MG TABLET         Allergies   Allergen Reactions    Metoclopramide Hcl Unknown    Morphine Unknown    Vancomycin Unknown        Physical Exam       ED Triage Vitals [01/03/25 1646]   Temperature Heart Rate Respirations BP   36.6 °C (97.9 °F) 94 20 130/88      Pulse Ox Temp Source Heart Rate Source Patient Position   96 % Temporal Monitor Sitting      BP Location FiO2 (%)     Left arm --         Physical Exam    GENERAL:  The patient appears nourished and normally developed. Vital signs as documented.     HEENT:  Head normocephalic, atraumatic, EOMs intact, PERRLA, Mucous membranes moist. Nares patent without copious rhinorrhea.  No lymphadenopathy.    PULMONARY:  Lungs are clear to auscultation, without any respiratory distress. Able to speak full sentences, no accessory muscle use    CARDIAC:   Normal rate. No murmurs, rubs or gallops    ABDOMEN:  Soft, non distended, non tender, BS positive x 4 quadrants, No rebound or guarding, no peritoneal signs, no CVA tenderness, no masses or organomegaly    MUSCULOSKELETAL:   Able to ambulate, Non edematous, with no obvious deformities. Pulses intact distal    SKIN: Follicular rash noted posterior upper thighs bilaterally with some surrounding induration, no areas of fluctuance, no purulent drainage, no crepitus, inflamed  "follicle noted right anterior chest and mild erythema to top of left ear.    NEURO:  No obvious neurological deficits, normal sensation and strength bilaterally.  Able to follow commands, NIH 0, CN 2-12 intact.        Diagnostics   Labs:  Labs Reviewed - No data to display  Radiographs:  No orders to display           Assessment   In brief, Kym Jennings is a 44 y.o. female who presented to the emergency department for concern for cellulitis    Plan   Antibiotics    Differentials   Folliculitis  Cellulitis  Abscess  Allergic dermatitis    ED Course     Diagnoses as of 01/03/25 1719   Folliculitis       Visit Vitals  /88 (BP Location: Left arm, Patient Position: Sitting)   Pulse 94   Temp 36.6 °C (97.9 °F) (Temporal)   Resp 20   Ht 1.727 m (5' 8\")   Wt 65.8 kg (145 lb)   SpO2 96%   BMI 22.05 kg/m²   OB Status Hysterectomy   Smoking Status Every Day   BSA 1.78 m²       Medications - No data to display    Plan of care discussed, patient is stable appearing, in no distress, is afebrile, is not tachycardic.  Denies any exposure to any new lotions, soaps, detergents, do not feel this is allergic.  Likely folliculitis with mild cellulitis, will be placed on doxycycline which she states that she has taken in the past, we discharged home in stable condition, encouraged to follow-up with dermatology, educated on any worsening signs and symptoms to return to the emergency department      Final Impression      1. Folliculitis          DISPOSITION  Disposition: Discharge  Patient condition is: Stable    Comment: Please note this report has been produced using speech recognition software and may contain errors related to that system including errors in grammar, punctuation, and spelling, as well as words and phrases that may be inappropriate.  If there are any questions or concerns please feel free to contact the dictating provider for clarification.    BATOOL Majano-ODESSA Welch  01/03/25 " 1716

## 2025-01-03 NOTE — ED TRIAGE NOTES
"Pt BIB POV from home w/ c/o \"cellulitis everywhere.\" C/o myalgia and h/o fibromyalgia. Denies URI, n/v. Ambulatory, NAD, oriented.   "

## 2025-03-22 ENCOUNTER — HOSPITAL ENCOUNTER (OUTPATIENT)
Facility: HOSPITAL | Age: 45
Setting detail: OBSERVATION
End: 2025-03-22
Attending: STUDENT IN AN ORGANIZED HEALTH CARE EDUCATION/TRAINING PROGRAM | Admitting: INTERNAL MEDICINE
Payer: MEDICAID

## 2025-03-22 DIAGNOSIS — K56.600 PARTIAL INTESTINAL OBSTRUCTION, UNSPECIFIED CAUSE (MULTI): ICD-10-CM

## 2025-03-22 DIAGNOSIS — N39.0 UTI (URINARY TRACT INFECTION), UNCOMPLICATED: ICD-10-CM

## 2025-03-22 DIAGNOSIS — F32.A ANXIETY AND DEPRESSION: ICD-10-CM

## 2025-03-22 DIAGNOSIS — K63.9 DISORDER OF INTESTINE: Primary | ICD-10-CM

## 2025-03-22 DIAGNOSIS — R10.9 ABDOMINAL PAIN, UNSPECIFIED ABDOMINAL LOCATION: ICD-10-CM

## 2025-03-22 DIAGNOSIS — Z87.19 HISTORY OF CROHN'S DISEASE: ICD-10-CM

## 2025-03-22 DIAGNOSIS — K50.819 CROHN'S DISEASE OF SMALL AND LARGE INTESTINES WITH COMPLICATION (MULTI): ICD-10-CM

## 2025-03-22 DIAGNOSIS — F41.9 ANXIETY AND DEPRESSION: ICD-10-CM

## 2025-03-22 DIAGNOSIS — M79.10 MYALGIA: ICD-10-CM

## 2025-03-22 PROCEDURE — 99285 EMERGENCY DEPT VISIT HI MDM: CPT | Performed by: STUDENT IN AN ORGANIZED HEALTH CARE EDUCATION/TRAINING PROGRAM

## 2025-03-22 PROCEDURE — 87637 SARSCOV2&INF A&B&RSV AMP PRB: CPT | Performed by: PHYSICIAN ASSISTANT

## 2025-03-22 ASSESSMENT — PAIN - FUNCTIONAL ASSESSMENT: PAIN_FUNCTIONAL_ASSESSMENT: 0-10

## 2025-03-22 ASSESSMENT — PAIN SCALES - GENERAL: PAINLEVEL_OUTOF10: 0 - NO PAIN

## 2025-03-23 ENCOUNTER — APPOINTMENT (OUTPATIENT)
Dept: RADIOLOGY | Facility: HOSPITAL | Age: 45
End: 2025-03-23
Payer: MEDICAID

## 2025-03-23 ENCOUNTER — APPOINTMENT (OUTPATIENT)
Dept: CARDIOLOGY | Facility: HOSPITAL | Age: 45
End: 2025-03-23
Payer: MEDICAID

## 2025-03-23 VITALS
OXYGEN SATURATION: 94 % | HEART RATE: 69 BPM | TEMPERATURE: 98.2 F | HEIGHT: 69 IN | BODY MASS INDEX: 19.99 KG/M2 | DIASTOLIC BLOOD PRESSURE: 73 MMHG | RESPIRATION RATE: 18 BRPM | WEIGHT: 135 LBS | SYSTOLIC BLOOD PRESSURE: 104 MMHG

## 2025-03-23 PROBLEM — K56.609 BOWEL OBSTRUCTION (MULTI): Status: ACTIVE | Noted: 2025-03-23

## 2025-03-23 LAB
ALBUMIN SERPL BCP-MCNC: 3.1 G/DL (ref 3.4–5)
ALBUMIN SERPL BCP-MCNC: 3.7 G/DL (ref 3.4–5)
ALP SERPL-CCNC: 58 U/L (ref 33–110)
ALT SERPL W P-5'-P-CCNC: 22 U/L (ref 7–45)
ANION GAP SERPL CALC-SCNC: 12 MMOL/L (ref 10–20)
ANION GAP SERPL CALC-SCNC: 16 MMOL/L (ref 10–20)
APPEARANCE UR: ABNORMAL
AST SERPL W P-5'-P-CCNC: 19 U/L (ref 9–39)
B-HCG SERPL-ACNC: <2 MIU/ML
BACTERIA #/AREA URNS AUTO: ABNORMAL /HPF
BASOPHILS # BLD AUTO: 0.04 X10*3/UL (ref 0–0.1)
BASOPHILS NFR BLD AUTO: 0.4 %
BILIRUB SERPL-MCNC: 0.4 MG/DL (ref 0–1.2)
BILIRUB UR STRIP.AUTO-MCNC: NEGATIVE MG/DL
BUN SERPL-MCNC: 6 MG/DL (ref 6–23)
BUN SERPL-MCNC: 7 MG/DL (ref 6–23)
CALCIUM SERPL-MCNC: 7.9 MG/DL (ref 8.6–10.3)
CALCIUM SERPL-MCNC: 8.4 MG/DL (ref 8.6–10.3)
CHLORIDE SERPL-SCNC: 104 MMOL/L (ref 98–107)
CHLORIDE SERPL-SCNC: 108 MMOL/L (ref 98–107)
CO2 SERPL-SCNC: 21 MMOL/L (ref 21–32)
CO2 SERPL-SCNC: 22 MMOL/L (ref 21–32)
COLOR UR: ABNORMAL
CREAT SERPL-MCNC: 0.53 MG/DL (ref 0.5–1.05)
CREAT SERPL-MCNC: 0.64 MG/DL (ref 0.5–1.05)
EGFRCR SERPLBLD CKD-EPI 2021: >90 ML/MIN/1.73M*2
EGFRCR SERPLBLD CKD-EPI 2021: >90 ML/MIN/1.73M*2
EOSINOPHIL # BLD AUTO: 0.06 X10*3/UL (ref 0–0.7)
EOSINOPHIL NFR BLD AUTO: 0.7 %
ERYTHROCYTE [DISTWIDTH] IN BLOOD BY AUTOMATED COUNT: 12.4 % (ref 11.5–14.5)
ERYTHROCYTE [DISTWIDTH] IN BLOOD BY AUTOMATED COUNT: 12.5 % (ref 11.5–14.5)
FLUAV RNA RESP QL NAA+PROBE: NOT DETECTED
FLUBV RNA RESP QL NAA+PROBE: NOT DETECTED
GLUCOSE SERPL-MCNC: 113 MG/DL (ref 74–99)
GLUCOSE SERPL-MCNC: 72 MG/DL (ref 74–99)
GLUCOSE UR STRIP.AUTO-MCNC: NORMAL MG/DL
HCG UR QL IA.RAPID: NEGATIVE
HCT VFR BLD AUTO: 37.2 % (ref 36–46)
HCT VFR BLD AUTO: 41 % (ref 36–46)
HGB BLD-MCNC: 12.7 G/DL (ref 12–16)
HGB BLD-MCNC: 13.7 G/DL (ref 12–16)
IMM GRANULOCYTES # BLD AUTO: 0.05 X10*3/UL (ref 0–0.7)
IMM GRANULOCYTES NFR BLD AUTO: 0.5 % (ref 0–0.9)
KETONES UR STRIP.AUTO-MCNC: NEGATIVE MG/DL
LACTATE SERPL-SCNC: 1.1 MMOL/L (ref 0.4–2)
LACTATE SERPL-SCNC: 2.2 MMOL/L (ref 0.4–2)
LEUKOCYTE ESTERASE UR QL STRIP.AUTO: NEGATIVE
LIPASE SERPL-CCNC: 68 U/L (ref 9–82)
LYMPHOCYTES # BLD AUTO: 1.6 X10*3/UL (ref 1.2–4.8)
LYMPHOCYTES NFR BLD AUTO: 17.4 %
MCH RBC QN AUTO: 31.3 PG (ref 26–34)
MCH RBC QN AUTO: 32.6 PG (ref 26–34)
MCHC RBC AUTO-ENTMCNC: 33.4 G/DL (ref 32–36)
MCHC RBC AUTO-ENTMCNC: 34.1 G/DL (ref 32–36)
MCV RBC AUTO: 94 FL (ref 80–100)
MCV RBC AUTO: 96 FL (ref 80–100)
MONOCYTES # BLD AUTO: 0.49 X10*3/UL (ref 0.1–1)
MONOCYTES NFR BLD AUTO: 5.3 %
MUCOUS THREADS #/AREA URNS AUTO: ABNORMAL /LPF
NEUTROPHILS # BLD AUTO: 6.93 X10*3/UL (ref 1.2–7.7)
NEUTROPHILS NFR BLD AUTO: 75.7 %
NITRITE UR QL STRIP.AUTO: ABNORMAL
NRBC BLD-RTO: 0 /100 WBCS (ref 0–0)
NRBC BLD-RTO: 0 /100 WBCS (ref 0–0)
PH UR STRIP.AUTO: 6.5 [PH]
PHOSPHATE SERPL-MCNC: 3.9 MG/DL (ref 2.5–4.9)
PLATELET # BLD AUTO: 266 X10*3/UL (ref 150–450)
PLATELET # BLD AUTO: 363 X10*3/UL (ref 150–450)
POTASSIUM SERPL-SCNC: 3.4 MMOL/L (ref 3.5–5.3)
POTASSIUM SERPL-SCNC: 4 MMOL/L (ref 3.5–5.3)
PROT SERPL-MCNC: 6.8 G/DL (ref 6.4–8.2)
PROT UR STRIP.AUTO-MCNC: NEGATIVE MG/DL
RBC # BLD AUTO: 3.89 X10*6/UL (ref 4–5.2)
RBC # BLD AUTO: 4.38 X10*6/UL (ref 4–5.2)
RBC # UR STRIP.AUTO: NEGATIVE MG/DL
RBC #/AREA URNS AUTO: ABNORMAL /HPF
RSV RNA RESP QL NAA+PROBE: NOT DETECTED
SARS-COV-2 RNA RESP QL NAA+PROBE: NOT DETECTED
SODIUM SERPL-SCNC: 138 MMOL/L (ref 136–145)
SODIUM SERPL-SCNC: 138 MMOL/L (ref 136–145)
SP GR UR STRIP.AUTO: 1.02
SQUAMOUS #/AREA URNS AUTO: ABNORMAL /HPF
UROBILINOGEN UR STRIP.AUTO-MCNC: NORMAL MG/DL
WBC # BLD AUTO: 7.6 X10*3/UL (ref 4.4–11.3)
WBC # BLD AUTO: 9.2 X10*3/UL (ref 4.4–11.3)
WBC #/AREA URNS AUTO: ABNORMAL /HPF

## 2025-03-23 PROCEDURE — 2500000004 HC RX 250 GENERAL PHARMACY W/ HCPCS (ALT 636 FOR OP/ED)

## 2025-03-23 PROCEDURE — 2500000001 HC RX 250 WO HCPCS SELF ADMINISTERED DRUGS (ALT 637 FOR MEDICARE OP)

## 2025-03-23 PROCEDURE — 96375 TX/PRO/DX INJ NEW DRUG ADDON: CPT

## 2025-03-23 PROCEDURE — 2550000001 HC RX 255 CONTRASTS: Performed by: STUDENT IN AN ORGANIZED HEALTH CARE EDUCATION/TRAINING PROGRAM

## 2025-03-23 PROCEDURE — S4991 NICOTINE PATCH NONLEGEND: HCPCS

## 2025-03-23 PROCEDURE — 74177 CT ABD & PELVIS W/CONTRAST: CPT

## 2025-03-23 PROCEDURE — 2500000004 HC RX 250 GENERAL PHARMACY W/ HCPCS (ALT 636 FOR OP/ED): Performed by: STUDENT IN AN ORGANIZED HEALTH CARE EDUCATION/TRAINING PROGRAM

## 2025-03-23 PROCEDURE — G0378 HOSPITAL OBSERVATION PER HR: HCPCS

## 2025-03-23 PROCEDURE — 80053 COMPREHEN METABOLIC PANEL: CPT | Performed by: PHYSICIAN ASSISTANT

## 2025-03-23 PROCEDURE — 2500000002 HC RX 250 W HCPCS SELF ADMINISTERED DRUGS (ALT 637 FOR MEDICARE OP, ALT 636 FOR OP/ED): Performed by: STUDENT IN AN ORGANIZED HEALTH CARE EDUCATION/TRAINING PROGRAM

## 2025-03-23 PROCEDURE — 2500000002 HC RX 250 W HCPCS SELF ADMINISTERED DRUGS (ALT 637 FOR MEDICARE OP, ALT 636 FOR OP/ED)

## 2025-03-23 PROCEDURE — 81001 URINALYSIS AUTO W/SCOPE: CPT | Performed by: PHYSICIAN ASSISTANT

## 2025-03-23 PROCEDURE — 87086 URINE CULTURE/COLONY COUNT: CPT | Mod: GEALAB | Performed by: PHYSICIAN ASSISTANT

## 2025-03-23 PROCEDURE — 74177 CT ABD & PELVIS W/CONTRAST: CPT | Performed by: RADIOLOGY

## 2025-03-23 PROCEDURE — 84702 CHORIONIC GONADOTROPIN TEST: CPT | Performed by: STUDENT IN AN ORGANIZED HEALTH CARE EDUCATION/TRAINING PROGRAM

## 2025-03-23 PROCEDURE — 84100 ASSAY OF PHOSPHORUS: CPT

## 2025-03-23 PROCEDURE — 85027 COMPLETE CBC AUTOMATED: CPT

## 2025-03-23 PROCEDURE — 99223 1ST HOSP IP/OBS HIGH 75: CPT

## 2025-03-23 PROCEDURE — 83605 ASSAY OF LACTIC ACID: CPT

## 2025-03-23 PROCEDURE — 96361 HYDRATE IV INFUSION ADD-ON: CPT

## 2025-03-23 PROCEDURE — 36415 COLL VENOUS BLD VENIPUNCTURE: CPT

## 2025-03-23 PROCEDURE — 2500000001 HC RX 250 WO HCPCS SELF ADMINISTERED DRUGS (ALT 637 FOR MEDICARE OP): Performed by: STUDENT IN AN ORGANIZED HEALTH CARE EDUCATION/TRAINING PROGRAM

## 2025-03-23 PROCEDURE — 83690 ASSAY OF LIPASE: CPT | Performed by: PHYSICIAN ASSISTANT

## 2025-03-23 PROCEDURE — 96374 THER/PROPH/DIAG INJ IV PUSH: CPT

## 2025-03-23 PROCEDURE — 83605 ASSAY OF LACTIC ACID: CPT | Performed by: PHYSICIAN ASSISTANT

## 2025-03-23 PROCEDURE — 81025 URINE PREGNANCY TEST: CPT

## 2025-03-23 PROCEDURE — 36415 COLL VENOUS BLD VENIPUNCTURE: CPT | Performed by: PHYSICIAN ASSISTANT

## 2025-03-23 PROCEDURE — 93005 ELECTROCARDIOGRAM TRACING: CPT

## 2025-03-23 PROCEDURE — 85025 COMPLETE CBC W/AUTO DIFF WBC: CPT | Performed by: PHYSICIAN ASSISTANT

## 2025-03-23 RX ORDER — DICLOFENAC SODIUM 10 MG/G
4 GEL TOPICAL 4 TIMES DAILY PRN
Status: DISCONTINUED | OUTPATIENT
Start: 2025-03-23 | End: 2025-03-25 | Stop reason: HOSPADM

## 2025-03-23 RX ORDER — POTASSIUM CHLORIDE 20 MEQ/1
40 TABLET, EXTENDED RELEASE ORAL ONCE
Status: COMPLETED | OUTPATIENT
Start: 2025-03-23 | End: 2025-03-23

## 2025-03-23 RX ORDER — OLANZAPINE 5 MG/1
5 TABLET ORAL NIGHTLY
Status: DISCONTINUED | OUTPATIENT
Start: 2025-03-23 | End: 2025-03-24

## 2025-03-23 RX ORDER — HYDROXYZINE HYDROCHLORIDE 25 MG/1
50 TABLET, FILM COATED ORAL 3 TIMES DAILY PRN
Status: DISCONTINUED | OUTPATIENT
Start: 2025-03-23 | End: 2025-03-24

## 2025-03-23 RX ORDER — ONDANSETRON HYDROCHLORIDE 2 MG/ML
4 INJECTION, SOLUTION INTRAVENOUS ONCE
Status: COMPLETED | OUTPATIENT
Start: 2025-03-23 | End: 2025-03-23

## 2025-03-23 RX ORDER — BUPROPION HYDROCHLORIDE 150 MG/1
150 TABLET, EXTENDED RELEASE ORAL 2 TIMES DAILY
Status: DISCONTINUED | OUTPATIENT
Start: 2025-03-23 | End: 2025-03-24

## 2025-03-23 RX ORDER — BUSPIRONE HYDROCHLORIDE 15 MG/1
30 TABLET ORAL 2 TIMES DAILY
Status: DISCONTINUED | OUTPATIENT
Start: 2025-03-23 | End: 2025-03-24

## 2025-03-23 RX ORDER — HYDROMORPHONE HYDROCHLORIDE 1 MG/ML
1 INJECTION, SOLUTION INTRAMUSCULAR; INTRAVENOUS; SUBCUTANEOUS ONCE
Status: COMPLETED | OUTPATIENT
Start: 2025-03-23 | End: 2025-03-23

## 2025-03-23 RX ORDER — DICYCLOMINE HYDROCHLORIDE 10 MG/1
20 CAPSULE ORAL ONCE
Status: COMPLETED | OUTPATIENT
Start: 2025-03-23 | End: 2025-03-23

## 2025-03-23 RX ORDER — ESCITALOPRAM OXALATE 20 MG/1
20 TABLET ORAL DAILY
Status: DISCONTINUED | OUTPATIENT
Start: 2025-03-23 | End: 2025-03-24

## 2025-03-23 RX ORDER — SODIUM CHLORIDE, SODIUM LACTATE, POTASSIUM CHLORIDE, CALCIUM CHLORIDE 600; 310; 30; 20 MG/100ML; MG/100ML; MG/100ML; MG/100ML
100 INJECTION, SOLUTION INTRAVENOUS CONTINUOUS
Status: ACTIVE | OUTPATIENT
Start: 2025-03-23 | End: 2025-03-23

## 2025-03-23 RX ORDER — HYDROMORPHONE HYDROCHLORIDE 1 MG/ML
0.6 INJECTION, SOLUTION INTRAMUSCULAR; INTRAVENOUS; SUBCUTANEOUS
Status: DISCONTINUED | OUTPATIENT
Start: 2025-03-23 | End: 2025-03-24

## 2025-03-23 RX ORDER — SODIUM CHLORIDE, SODIUM LACTATE, POTASSIUM CHLORIDE, CALCIUM CHLORIDE 600; 310; 30; 20 MG/100ML; MG/100ML; MG/100ML; MG/100ML
100 INJECTION, SOLUTION INTRAVENOUS CONTINUOUS
Status: ACTIVE | OUTPATIENT
Start: 2025-03-23 | End: 2025-03-24

## 2025-03-23 RX ORDER — ACETAMINOPHEN 325 MG/1
975 TABLET ORAL EVERY 6 HOURS PRN
Status: DISCONTINUED | OUTPATIENT
Start: 2025-03-23 | End: 2025-03-24

## 2025-03-23 RX ORDER — TRAZODONE HYDROCHLORIDE 50 MG/1
100 TABLET ORAL NIGHTLY PRN
Status: DISCONTINUED | OUTPATIENT
Start: 2025-03-23 | End: 2025-03-24

## 2025-03-23 RX ORDER — GABAPENTIN 300 MG/1
300 CAPSULE ORAL 3 TIMES DAILY
Status: DISCONTINUED | OUTPATIENT
Start: 2025-03-23 | End: 2025-03-25 | Stop reason: HOSPADM

## 2025-03-23 RX ORDER — QUETIAPINE FUMARATE 100 MG/1
200 TABLET, FILM COATED ORAL NIGHTLY
Status: DISCONTINUED | OUTPATIENT
Start: 2025-03-23 | End: 2025-03-24

## 2025-03-23 RX ORDER — CLONIDINE HYDROCHLORIDE 0.1 MG/1
0.2 TABLET ORAL 3 TIMES DAILY
Status: DISCONTINUED | OUTPATIENT
Start: 2025-03-23 | End: 2025-03-24

## 2025-03-23 RX ORDER — DULOXETIN HYDROCHLORIDE 60 MG/1
60 CAPSULE, DELAYED RELEASE ORAL DAILY
Status: DISCONTINUED | OUTPATIENT
Start: 2025-03-23 | End: 2025-03-24

## 2025-03-23 RX ORDER — SPIRONOLACTONE 25 MG/1
100 TABLET ORAL DAILY
Status: DISCONTINUED | OUTPATIENT
Start: 2025-03-23 | End: 2025-03-24

## 2025-03-23 RX ORDER — IBUPROFEN 200 MG
1 TABLET ORAL DAILY
Status: DISCONTINUED | OUTPATIENT
Start: 2025-03-23 | End: 2025-03-25 | Stop reason: HOSPADM

## 2025-03-23 RX ADMIN — HYDROMORPHONE HYDROCHLORIDE 1 MG: 1 INJECTION, SOLUTION INTRAMUSCULAR; INTRAVENOUS; SUBCUTANEOUS at 03:13

## 2025-03-23 RX ADMIN — POTASSIUM CHLORIDE 40 MEQ: 1500 TABLET, EXTENDED RELEASE ORAL at 01:46

## 2025-03-23 RX ADMIN — OLANZAPINE 5 MG: 5 TABLET, FILM COATED ORAL at 20:47

## 2025-03-23 RX ADMIN — BUPROPION HYDROCHLORIDE 150 MG: 150 TABLET, FILM COATED, EXTENDED RELEASE ORAL at 20:46

## 2025-03-23 RX ADMIN — DICYCLOMINE HYDROCHLORIDE 20 MG: 10 CAPSULE ORAL at 01:46

## 2025-03-23 RX ADMIN — BUSPIRONE HYDROCHLORIDE 30 MG: 15 TABLET ORAL at 20:47

## 2025-03-23 RX ADMIN — SODIUM CHLORIDE, SODIUM LACTATE, POTASSIUM CHLORIDE, AND CALCIUM CHLORIDE 100 ML/HR: .6; .31; .03; .02 INJECTION, SOLUTION INTRAVENOUS at 05:42

## 2025-03-23 RX ADMIN — DULOXETINE HYDROCHLORIDE 60 MG: 60 CAPSULE, DELAYED RELEASE ORAL at 20:47

## 2025-03-23 RX ADMIN — HYDROMORPHONE HYDROCHLORIDE 0.4 MG: 1 INJECTION, SOLUTION INTRAMUSCULAR; INTRAVENOUS; SUBCUTANEOUS at 18:16

## 2025-03-23 RX ADMIN — HYDROMORPHONE HYDROCHLORIDE 0.4 MG: 1 INJECTION, SOLUTION INTRAMUSCULAR; INTRAVENOUS; SUBCUTANEOUS at 20:46

## 2025-03-23 RX ADMIN — QUETIAPINE FUMARATE 200 MG: 100 TABLET ORAL at 20:47

## 2025-03-23 RX ADMIN — SODIUM CHLORIDE, SODIUM LACTATE, POTASSIUM CHLORIDE, AND CALCIUM CHLORIDE 100 ML/HR: .6; .31; .03; .02 INJECTION, SOLUTION INTRAVENOUS at 15:50

## 2025-03-23 RX ADMIN — DICLOFENAC SODIUM 4 G: 10 GEL TOPICAL at 22:30

## 2025-03-23 RX ADMIN — GABAPENTIN 300 MG: 300 CAPSULE ORAL at 20:46

## 2025-03-23 RX ADMIN — SPIRONOLACTONE 100 MG: 25 TABLET ORAL at 20:46

## 2025-03-23 RX ADMIN — HYDROMORPHONE HYDROCHLORIDE 0.2 MG: 1 INJECTION, SOLUTION INTRAMUSCULAR; INTRAVENOUS; SUBCUTANEOUS at 09:44

## 2025-03-23 RX ADMIN — ESCITALOPRAM OXALATE 20 MG: 20 TABLET ORAL at 20:47

## 2025-03-23 RX ADMIN — SODIUM CHLORIDE 1000 ML: 9 INJECTION, SOLUTION INTRAVENOUS at 01:46

## 2025-03-23 RX ADMIN — IOHEXOL 75 ML: 350 INJECTION, SOLUTION INTRAVENOUS at 01:26

## 2025-03-23 RX ADMIN — CLONIDINE HYDROCHLORIDE 0.2 MG: 0.1 TABLET ORAL at 20:48

## 2025-03-23 RX ADMIN — ONDANSETRON 4 MG: 2 INJECTION, SOLUTION INTRAMUSCULAR; INTRAVENOUS at 01:46

## 2025-03-23 RX ADMIN — NICOTINE 1 PATCH: 14 PATCH, EXTENDED RELEASE TRANSDERMAL at 09:44

## 2025-03-23 RX ADMIN — HYDROXYZINE HYDROCHLORIDE 50 MG: 25 TABLET, FILM COATED ORAL at 20:47

## 2025-03-23 RX ADMIN — HYDROMORPHONE HYDROCHLORIDE 0.4 MG: 1 INJECTION, SOLUTION INTRAMUSCULAR; INTRAVENOUS; SUBCUTANEOUS at 05:42

## 2025-03-23 SDOH — ECONOMIC STABILITY: HOUSING INSECURITY: AT ANY TIME IN THE PAST 12 MONTHS, WERE YOU HOMELESS OR LIVING IN A SHELTER (INCLUDING NOW)?: NO

## 2025-03-23 SDOH — ECONOMIC STABILITY: HOUSING INSECURITY: IN THE PAST 12 MONTHS, HOW MANY TIMES HAVE YOU MOVED WHERE YOU WERE LIVING?: 2

## 2025-03-23 SDOH — SOCIAL STABILITY: SOCIAL INSECURITY: WITHIN THE LAST YEAR, HAVE YOU BEEN AFRAID OF YOUR PARTNER OR EX-PARTNER?: YES

## 2025-03-23 SDOH — SOCIAL STABILITY: SOCIAL INSECURITY: WITHIN THE LAST YEAR, HAVE YOU BEEN HUMILIATED OR EMOTIONALLY ABUSED IN OTHER WAYS BY YOUR PARTNER OR EX-PARTNER?: YES

## 2025-03-23 SDOH — ECONOMIC STABILITY: FOOD INSECURITY
WITHIN THE PAST 12 MONTHS, YOU WORRIED THAT YOUR FOOD WOULD RUN OUT BEFORE YOU GOT THE MONEY TO BUY MORE.: SOMETIMES TRUE

## 2025-03-23 SDOH — ECONOMIC STABILITY: TRANSPORTATION INSECURITY: IN THE PAST 12 MONTHS, HAS LACK OF TRANSPORTATION KEPT YOU FROM MEDICAL APPOINTMENTS OR FROM GETTING MEDICATIONS?: NO

## 2025-03-23 SDOH — SOCIAL STABILITY: SOCIAL INSECURITY: ABUSE: ADULT

## 2025-03-23 SDOH — ECONOMIC STABILITY: FOOD INSECURITY: HOW HARD IS IT FOR YOU TO PAY FOR THE VERY BASICS LIKE FOOD, HOUSING, MEDICAL CARE, AND HEATING?: SOMEWHAT HARD

## 2025-03-23 SDOH — ECONOMIC STABILITY: HOUSING INSECURITY: IN THE LAST 12 MONTHS, WAS THERE A TIME WHEN YOU WERE NOT ABLE TO PAY THE MORTGAGE OR RENT ON TIME?: NO

## 2025-03-23 SDOH — SOCIAL STABILITY: SOCIAL INSECURITY: ARE THERE ANY APPARENT SIGNS OF INJURIES/BEHAVIORS THAT COULD BE RELATED TO ABUSE/NEGLECT?: NO

## 2025-03-23 SDOH — SOCIAL STABILITY: SOCIAL INSECURITY: DO YOU FEEL UNSAFE GOING BACK TO THE PLACE WHERE YOU ARE LIVING?: NO

## 2025-03-23 SDOH — SOCIAL STABILITY: SOCIAL INSECURITY
WITHIN THE LAST YEAR, HAVE YOU BEEN KICKED, HIT, SLAPPED, OR OTHERWISE PHYSICALLY HURT BY YOUR PARTNER OR EX-PARTNER?: YES

## 2025-03-23 SDOH — ECONOMIC STABILITY: INCOME INSECURITY: IN THE PAST 12 MONTHS HAS THE ELECTRIC, GAS, OIL, OR WATER COMPANY THREATENED TO SHUT OFF SERVICES IN YOUR HOME?: NO

## 2025-03-23 SDOH — SOCIAL STABILITY: SOCIAL INSECURITY
WITHIN THE LAST YEAR, HAVE YOU BEEN RAPED OR FORCED TO HAVE ANY KIND OF SEXUAL ACTIVITY BY YOUR PARTNER OR EX-PARTNER?: NO

## 2025-03-23 SDOH — SOCIAL STABILITY: SOCIAL INSECURITY: HAVE YOU HAD ANY THOUGHTS OF HARMING ANYONE ELSE?: NO

## 2025-03-23 SDOH — ECONOMIC STABILITY: FOOD INSECURITY: WITHIN THE PAST 12 MONTHS, THE FOOD YOU BOUGHT JUST DIDN'T LAST AND YOU DIDN'T HAVE MONEY TO GET MORE.: SOMETIMES TRUE

## 2025-03-23 SDOH — SOCIAL STABILITY: SOCIAL INSECURITY: HAS ANYONE EVER THREATENED TO HURT YOUR FAMILY OR YOUR PETS?: NO

## 2025-03-23 SDOH — SOCIAL STABILITY: SOCIAL INSECURITY: ARE YOU OR HAVE YOU BEEN THREATENED OR ABUSED PHYSICALLY, EMOTIONALLY, OR SEXUALLY BY ANYONE?: NO

## 2025-03-23 SDOH — ECONOMIC STABILITY: HOUSING INSECURITY: IN THE LAST 12 MONTHS, WAS THERE A TIME WHEN YOU WERE NOT ABLE TO PAY THE MORTGAGE OR RENT ON TIME?: PATIENT DECLINED

## 2025-03-23 SDOH — SOCIAL STABILITY: SOCIAL INSECURITY: WERE YOU ABLE TO COMPLETE ALL THE BEHAVIORAL HEALTH SCREENINGS?: YES

## 2025-03-23 SDOH — SOCIAL STABILITY: SOCIAL INSECURITY: HAVE YOU HAD THOUGHTS OF HARMING ANYONE ELSE?: NO

## 2025-03-23 SDOH — ECONOMIC STABILITY: HOUSING INSECURITY: AT ANY TIME IN THE PAST 12 MONTHS, WERE YOU HOMELESS OR LIVING IN A SHELTER (INCLUDING NOW)?: PATIENT DECLINED

## 2025-03-23 SDOH — ECONOMIC STABILITY: TRANSPORTATION INSECURITY
IN THE PAST 12 MONTHS, HAS LACK OF TRANSPORTATION KEPT YOU FROM MEDICAL APPOINTMENTS OR FROM GETTING MEDICATIONS?: PATIENT DECLINED

## 2025-03-23 SDOH — SOCIAL STABILITY: SOCIAL INSECURITY: DO YOU FEEL ANYONE HAS EXPLOITED OR TAKEN ADVANTAGE OF YOU FINANCIALLY OR OF YOUR PERSONAL PROPERTY?: NO

## 2025-03-23 SDOH — ECONOMIC STABILITY: FOOD INSECURITY: HOW HARD IS IT FOR YOU TO PAY FOR THE VERY BASICS LIKE FOOD, HOUSING, MEDICAL CARE, AND HEATING?: PATIENT DECLINED

## 2025-03-23 SDOH — SOCIAL STABILITY: SOCIAL INSECURITY: DOES ANYONE TRY TO KEEP YOU FROM HAVING/CONTACTING OTHER FRIENDS OR DOING THINGS OUTSIDE YOUR HOME?: NO

## 2025-03-23 ASSESSMENT — COGNITIVE AND FUNCTIONAL STATUS - GENERAL
MOBILITY SCORE: 24
PATIENT BASELINE BEDBOUND: NO
DAILY ACTIVITIY SCORE: 24
MOBILITY SCORE: 24
DAILY ACTIVITIY SCORE: 24
MOBILITY SCORE: 24
DAILY ACTIVITIY SCORE: 24

## 2025-03-23 ASSESSMENT — PAIN DESCRIPTION - ORIENTATION
ORIENTATION: MID
ORIENTATION: MID

## 2025-03-23 ASSESSMENT — ACTIVITIES OF DAILY LIVING (ADL)
FEEDING YOURSELF: INDEPENDENT
LACK_OF_TRANSPORTATION: PATIENT DECLINED
JUDGMENT_ADEQUATE_SAFELY_COMPLETE_DAILY_ACTIVITIES: YES
DRESSING YOURSELF: INDEPENDENT
LACK_OF_TRANSPORTATION: NO
HEARING - LEFT EAR: FUNCTIONAL
ADEQUATE_TO_COMPLETE_ADL: YES
GROOMING: INDEPENDENT
HEARING - RIGHT EAR: FUNCTIONAL
TOILETING: INDEPENDENT
WALKS IN HOME: INDEPENDENT
BATHING: INDEPENDENT
PATIENT'S MEMORY ADEQUATE TO SAFELY COMPLETE DAILY ACTIVITIES?: YES
LACK_OF_TRANSPORTATION: NO

## 2025-03-23 ASSESSMENT — PAIN - FUNCTIONAL ASSESSMENT
PAIN_FUNCTIONAL_ASSESSMENT: 0-10

## 2025-03-23 ASSESSMENT — PAIN SCALES - GENERAL
PAINLEVEL_OUTOF10: 4
PAINLEVEL_OUTOF10: 3
PAINLEVEL_OUTOF10: 8
PAINLEVEL_OUTOF10: 2
PAINLEVEL_OUTOF10: 7
PAINLEVEL_OUTOF10: 7

## 2025-03-23 ASSESSMENT — LIFESTYLE VARIABLES
HOW MANY STANDARD DRINKS CONTAINING ALCOHOL DO YOU HAVE ON A TYPICAL DAY: PATIENT DOES NOT DRINK
AUDIT-C TOTAL SCORE: 0
AUDIT-C TOTAL SCORE: 0
SKIP TO QUESTIONS 9-10: 1
HOW OFTEN DO YOU HAVE 6 OR MORE DRINKS ON ONE OCCASION: NEVER
HOW OFTEN DO YOU HAVE A DRINK CONTAINING ALCOHOL: NEVER

## 2025-03-23 ASSESSMENT — PATIENT HEALTH QUESTIONNAIRE - PHQ9
SUM OF ALL RESPONSES TO PHQ9 QUESTIONS 1 & 2: 0
1. LITTLE INTEREST OR PLEASURE IN DOING THINGS: NOT AT ALL
2. FEELING DOWN, DEPRESSED OR HOPELESS: NOT AT ALL

## 2025-03-23 ASSESSMENT — PAIN DESCRIPTION - LOCATION
LOCATION: ABDOMEN
LOCATION: ABDOMEN

## 2025-03-23 ASSESSMENT — PAIN DESCRIPTION - DESCRIPTORS: DESCRIPTORS: CRAMPING

## 2025-03-23 NOTE — PROGRESS NOTES
"  Subjective    No acute events overnight. Pt was seen and examined by bedside. Pt reported \"cramp\" like abdominal pain at epigastric region. Pt denied any N/V/diarrhea. Pt explains she has not had an episode of diarrhea since hat was placed on toilet.   Objective    Vitals  Visit Vitals  BP 95/60 (BP Location: Left arm)   Pulse 68   Temp 36.5 °C (97.7 °F) (Temporal)   Resp 17   Ht 1.753 m (5' 9\")   Wt 61.2 kg (135 lb)   SpO2 93%   BMI 19.94 kg/m²   OB Status Hysterectomy   Smoking Status Every Day   BSA 1.73 m²       Physical Exam   Constitutional: Appears stated age  HEENT: EOMI, clear sclera, moist mucous membranes  CV: RRR, No M/R/G  PULM: CTAB, no coughing or wheezing  ABDOMEN: Soft, epigastric tenderness. Hypoactive B. Sounds   SKIN: Normal Color, Warm, Dry, No Rashes   EXTREMITIES: Non-Tender, Full ROM  NEURO: A&O x 3  PSYCH: Normal Mood & Behavior       IOs    Intake/Output Summary (Last 24 hours) at 3/23/2025 1333  Last data filed at 3/23/2025 1146  Gross per 24 hour   Intake 510 ml   Output --   Net 510 ml       Labs:   Results from last 72 hours   Lab Units 03/23/25  0647 03/23/25  0053   SODIUM mmol/L 138 138   POTASSIUM mmol/L 4.0 3.4*   CHLORIDE mmol/L 108* 104   CO2 mmol/L 22 21   BUN mg/dL 6 7   CREATININE mg/dL 0.53 0.64   GLUCOSE mg/dL 72* 113*   CALCIUM mg/dL 7.9* 8.4*   ANION GAP mmol/L 12 16   EGFR mL/min/1.73m*2 >90 >90   PHOSPHORUS mg/dL 3.9  --       Results from last 72 hours   Lab Units 03/23/25  0647 03/23/25  0053   WBC AUTO x10*3/uL 7.6 9.2   HEMOGLOBIN g/dL 12.7 13.7   HEMATOCRIT % 37.2 41.0   PLATELETS AUTO x10*3/uL 266 363   NEUTROS PCT AUTO %  --  75.7   LYMPHS PCT AUTO %  --  17.4   MONOS PCT AUTO %  --  5.3   EOS PCT AUTO %  --  0.7      Lab Results   Component Value Date    CALCIUM 7.9 (L) 03/23/2025    PHOS 3.9 03/23/2025      Lab Results   Component Value Date    CRP 0.93 02/09/2024     Lab Results   Component Value Date    URINECULTURE No growth 11/28/2023       Images  CT " abdomen pelvis w IV contrast    Result Date: 3/23/2025  Inflammatory changes involving several loops of distal ileum suggesting infectious or inflammatory enteritis. The distal ileum/terminal ileum is diffusely narrowed, likely stricture. Of small bowel loops are mildly dilated, suggesting partial obstruction.   Interval development of diffuse osseous sclerosis. Please correlate clinically for hyperparathyroidism. Additional considerations include systemic diseases such as leukemia/lymphoma, anemias, myelofibrosis (although these may be less likely as the spleen is normal in size).   Mild hepatomegaly.   MACRO: None   Signed by: Kahte Aleman 3/23/2025 2:04 AM Dictation workstation:   [id number]  Meds  Scheduled medications  nicotine, 1 patch, transdermal, Daily      Continuous medications  lactated Ringer's, 100 mL/hr, Last Rate: 100 mL/hr (03/23/25 1048)      PRN medications  PRN medications: acetaminophen, HYDROmorphone, HYDROmorphone, HYDROmorphone     Problem List    Problem list:   Patient Active Problem List   Diagnosis    Abdominal bloating    Constipation    Diarrhea    Epigastric pain    Abdominal pain, RUQ (right upper quadrant)    Anxiety and depression    Arthralgia    Myalgia    Bilateral hand numbness    Crohn's disease of small intestine with rectal bleeding (Multi)    Hernia, epigastric    Irritable bowel syndrome (IBS)    PTSD (post-traumatic stress disorder)    Burn    Narcotic bowel syndrome (Multi)    Nausea with vomiting    Neuropathy involving both lower extremities    Nicotine dependence    Opioid use disorder    Pancreas divisum    Paronychia of toe of left foot    Small bowel anastomotic stricture    Small intestinal bacterial overgrowth    Wound of skin    Stress    Injury of ankle    Exacerbation of Crohn's disease of large intestine (Multi)    Diastolic hypertension    Crohn's disease (Multi)    Anxiety    Acute lower urinary tract infection    Accidental fall    Abdominal pain     "Bowel obstruction (Multi)        Assessment and Plan    Kym Jennings is a 44 y.o. female w/ PMH of OUD, nicotine dependence, anxiety, insomnia, fibromyalgia, chronic pancreatitis s/p stent, and crohn's disease (s/p 2 bowel resections, last one 3-4 yrs ago, not currently on any therapy) who is admitted for abdominal pain 2/2 gastroenteritis and partial SBO vs crohn's flare.      Acute Medical Issues   # Abdominal pain 2/2 pSBO d/t stricture vs enteritis vs crohn's flare  - hx of crohns' disease s/p bowel resections.   - CT abdomen with findings of inflammatory changes in distal ileum suggesting infectious or inflammatory enteritis.  Distal ileum also diffusely narrowed likely stricture, small bowel loops are mildly dilated suggesting partial obstruction  Plan:   - NPO currently  - Fluid Resuscitation: LR @ 100ml/hr for 10 hrs  - Consider NGT/ Dobhoff placed for decompression if vomiting or persistently nauseous   - Zofran 4 mg Q4Hr prn Nausea and/or vomiting  - Multimodal Pain Regimen: Tylenol for mild pain, Dilaudid 0.2 mg IV  for moderate pain, Dilaudid 0.4 mg IV  for severe pain, Dilaudid 0.6 mg IV  for breakthrough pain   - Surgery consulted  - Repeat KUB in 6 to 24 Hr  - Monitor for clinical deterioration; can be indicated by fever, tachycardia, increasing abdominal pain or distension, acidosis, or leukocytosis.   - Hold steroids at this time given less suspicion for active crohns flare given no bloody diarrhea or weight loss. Pt has previously refused high dose steroids as she doesn't like the \"feeling of it\".       Chronic Medical Issues   # FRED/MDD, PTSD  # fibromyalgia, peripheral neuropathy  # tobacco dependence: nicotine patch ordered     F: LR 100ml/ hr for 10 hrs  E: Replete as needed   N: NPO  GI ppx: None  DVT ppx: Lovenox Subq  Antibiotics: none  Tubes/Lines/Drains: PIV     Code Status: Full code    Disposition: 44 y.o.female admitted for pSBO and enteritis. Estimated length of stay < 48 hrs. "     Huber Yao DO   Internal Medicine, PGY-1

## 2025-03-23 NOTE — H&P
CHANTEL Jennings is a 44 y.o. female w/ PMH of OUD, anxiety, insomnia, fibromyalgia, chronic pancreatitis s/p stent, and crohn's disease (s/p 2 bowel resections, last one 3-4 yrs ago, not currently on any therapy) who presented to the hospital for abdominal pain that began 2 days ago.  She states abdominal pain is in the center region of the upper and lower abdomen.  She states it is constant and denies any significant alleviating factors.  She has not tried anything for the pain at home.  She does also have associated nausea however no episodes of vomiting, nonbloody and watery diarrhea occurring almost every hour, and a headache. Reports history of diarrhea however is controlled with diet and is triggered by certain foods likes red meat which she avoids. Patient reports decreased appetite and poor oral intake for the past 2 days.  She denies any fevers or chills, shortness of breath, chest pain, dizziness, or changes in urination. She also denies any significant weight loss recently.    Of note, patient has had multiple admissions in the past due to similar symptoms and was found to have partial small bowel obstruction versus Crohn's flare. She has been seen by GI previously however does not have consistent follow-up.  She was previously trialed on several immunotherapies for Crohn's disease, last ones being Stelara infusion and skrizi. Last appointment with GI was in February 2024 and was lost to follow up due to being incarcerated. Last colonoscopy was in 08/2022 which showed neoterminal ileum which had chronic and stable ulcers. No strictures or stenosis noted. She currently resides at home with mom. She denies any current substance use.     ED course:     Vitals: Afebrile, , RR 18, /85, 98% on room air    Labs:   -CBC -unremarkable  -CMP -glucose 113, , K 3.4, calcium 8.4, otherwise unremarkable  -Lipase: 68  -Lactate: 2.2    Imaging:   -CT A/P: Inflammatory changes involving several  loops of distal ileum suggesting infectious or inflammatory enteritis.  The distal ileum terminal ileum is diffusely narrowed likely stricture.  Small bowel loops are mildly dilated suggesting partial obstruction.  Interval development of diffuse osseous sclerosis.    Micro:   - COVID negative  - influenza A/B negative  -RSV negative    Interventions: Bentyl 20 mg, Dilaudid 1 mg, Zofran 4 mg, potassium chloride 40, NS 1 L    ROS: 12 points review of system is negative except as stated in the HPI above.   Past Medical History     Past Medical History:   Diagnosis Date    Crohn's disease (Multi)     Right lower quadrant pain 2022    Abdominal pain, RLQ (right lower quadrant)      Surgical History     Past Surgical History:   Procedure Laterality Date    OTHER SURGICAL HISTORY  2022    Ovarian cystectomy    OTHER SURGICAL HISTORY  2022    Esophagogastroduodenoscopy    OTHER SURGICAL HISTORY  2022    Colonoscopy    OTHER SURGICAL HISTORY  2022    Small bowel resection    OTHER SURGICAL HISTORY  2022    Hysterectomy    OTHER SURGICAL HISTORY  2022     section     Family History     Family History   Problem Relation Name Age of Onset    Clotting disorder Father      Diabetes Other Paternal Relatives     Clotting disorder Other Paternal Relatives      Social History     Social History     Socioeconomic History    Marital status: Single     Spouse name: Not on file    Number of children: Not on file    Years of education: Not on file    Highest education level: Not on file   Occupational History    Not on file   Tobacco Use    Smoking status: Every Day     Current packs/day: 1.00     Average packs/day: 1 pack/day for 20.0 years (20.0 ttl pk-yrs)     Types: Cigarettes    Smokeless tobacco: Never   Vaping Use    Vaping status: Never Used   Substance and Sexual Activity    Alcohol use: Not Currently    Drug use: Not Currently     Types: Methamphetamines, Amphetamines, Marijuana  "   Sexual activity: Not Currently   Other Topics Concern    Not on file   Social History Narrative    Not on file     Social Drivers of Health     Financial Resource Strain: Not on file   Food Insecurity: No Food Insecurity (2/6/2024)    Hunger Vital Sign     Worried About Running Out of Food in the Last Year: Never true     Ran Out of Food in the Last Year: Never true   Transportation Needs: Not on file   Physical Activity: Not on file   Stress: Not on file   Social Connections: Not on file   Intimate Partner Violence: Not on file   Housing Stability: Not on file       Tobacco Use: High Risk (1/3/2025)    Patient History     Smoking Tobacco Use: Every Day     Smokeless Tobacco Use: Never     Passive Exposure: Not on file        Social History     Substance and Sexual Activity   Alcohol Use Not Currently      Allergies     Allergies   Allergen Reactions    Metoclopramide Hcl Unknown    Morphine Unknown    Vancomycin Unknown      Meds    Scheduled medications    Continuous medications    PRN medications     Objective     Vitals  Visit Vitals  /85   Pulse (!) 110   Temp 36.8 °C (98.2 °F)   Resp 18   Ht 1.778 m (5' 10\")   Wt 61.2 kg (135 lb)   SpO2 98%   BMI 19.37 kg/m²   OB Status Hysterectomy   Smoking Status Every Day   BSA 1.74 m²        Physical Examination:  Gen: NAD  HEENT: AT/NC, no conjunctival pallor, anicteric sclera, EOMI   Chest: CTAB, no wheezing / labored respirations  CVS: RRR, no murmurs  Abd: Prior surgical scars noted overlying umbilicus, soft, mildly distended. TTP on epigastric and umbilical regions. Normal bowel sounds  Ext: no cyanosis/clubbing or pedal edema  Neuro: AOx3, no focal neuro deficits.     I/Os  No intake or output data in the 24 hours ending 03/23/25 0323    Labs:   Results from last 72 hours   Lab Units 03/23/25  0053   SODIUM mmol/L 138   POTASSIUM mmol/L 3.4*   CHLORIDE mmol/L 104   CO2 mmol/L 21   BUN mg/dL 7   CREATININE mg/dL 0.64   GLUCOSE mg/dL 113*   CALCIUM mg/dL 8.4* " "  ANION GAP mmol/L 16   EGFR mL/min/1.73m*2 >90      Results from last 72 hours   Lab Units 03/23/25  0053   WBC AUTO x10*3/uL 9.2   HEMOGLOBIN g/dL 13.7   HEMATOCRIT % 41.0   PLATELETS AUTO x10*3/uL 363   NEUTROS PCT AUTO % 75.7   LYMPHS PCT AUTO % 17.4   MONOS PCT AUTO % 5.3   EOS PCT AUTO % 0.7      Lab Results   Component Value Date    CALCIUM 8.4 (L) 03/23/2025    PHOS 4.5 08/12/2022      Lab Results   Component Value Date    CRP 0.93 02/09/2024      [unfilled]     Micro/ID:   No results found for the last 90 days.                   No lab exists for component: \"AGALPCRNB\"   .ID  Lab Results   Component Value Date    URINECULTURE No growth 11/28/2023     Images    CT abdomen pelvis w IV contrast  Narrative: Interpreted By:  Kathe Aleman,   STUDY:  CT ABDOMEN PELVIS W IV CONTRAST;  3/23/2025 1:25 am      INDICATION:  Signs/Symptoms:foul smelling diarrhea and diffuse abdominal x one  week.      COMPARISON:  09/06/2022      ACCESSION NUMBER(S):  [id number]      ORDERING CLINICIAN:  EVELINA FALK      TECHNIQUE:  Axial CT images of the abdomen and pelvis with coronal and sagittal  reconstructed images obtained after intravenous administration of  contrast      FINDINGS:  LOWER CHEST: No acute abnormality of the lung bases.  BONES: Interval development diffuse osseous sclerosis involving the  entire imaged skeleton. No significant cortical thickening or osseous  expansion. There are erosions at the pubic symphysis and  subligamentous/subtendinous erosions involving the upper sacroiliac  joint and pubic body. No significant enthesopathic changes or lytic  lesions. ABDOMINAL WALL: Within normal limits.      ABDOMEN:      LIVER: Enlarged, 20 cm in craniocaudad length. Parenchymal  attenuation is within normal limits. BILE DUCTS: No biliary  dilatation. GALLBLADDER: Cholecystectomy.  PANCREAS: Within normal limits.  SPLEEN: Few scattered calcified splenic granulomas.  ADRENALS: Within normal limits.  KIDNEYS " and URETERS: Kidneys are normal in size. Symmetric renal  enhancement. No hydronephrosis or perinephric fluid collection.          VESSELS: No aortic aneurysm.  RETROPERITONEUM: No pathologically enlarged retroperitoneal lymph  nodes.      PELVIS:      REPRODUCTIVE ORGANS: Status post hysterectomy.  BLADDER: Within normal limits.      BOWEL: No dilated bowel. Postsurgical changes of the right  hemicolon/distal ilium with ileocolic anastomosis. There is long  segment wall thickening and mucosal hyperenhancement of multiple  loops of distal ileum, some of which are dilated. There is long  segment narrowing of the distal ileum/terminal ileum. The appendix  has been removed. PERITONEUM: No ascites or free air, no fluid  collection.      Impression: Inflammatory changes involving several loops of distal ileum  suggesting infectious or inflammatory enteritis. The distal  ileum/terminal ileum is diffusely narrowed, likely stricture. Of  small bowel loops are mildly dilated, suggesting partial obstruction.      Interval development of diffuse osseous sclerosis. Please correlate  clinically for hyperparathyroidism. Additional considerations include  systemic diseases such as leukemia/lymphoma, anemias, myelofibrosis  (although these may be less likely as the spleen is normal in size).      Mild hepatomegaly.      MACRO:  None      Signed by: Kathe Aleman 3/23/2025 2:04 AM  Dictation workstation:   [id number]    Assessment and Plan    Kym Jennings is a 44 y.o. female w/ PMH of OUD, nicotine dependence, anxiety, insomnia, fibromyalgia, chronic pancreatitis s/p stent, and crohn's disease (s/p 2 bowel resections, last one 3-4 yrs ago, not currently on any therapy) who is admitted for abdominal pain 2/2 gastroenteritis and partial SBO vs crohn's flare.     Acute Medical Issues   # Abdominal pain 2/2 pSBO d/t stricture vs enteritis vs crohn's flare  - Sx: abdominal pain, bloating, watery diarrhea, nausea  - hx of crohns'  "disease s/p bowel resections. PE with TTP in epigastric and umbilical regions, normal bowel sounds present. Labs relatively unremarkable, vitally stable.   -CT abdomen with findings of inflammatory changes in distal ileum suggesting infectious or inflammatory enteritis.  Distal ileum also diffusely narrowed likely stricture, small bowel loops are mildly dilated suggesting partial obstruction.  - NPO currently  - Fluid Resuscitation: LR @ 100ml/hr for 10 hrs  - Consider NGT/ Dobhoff placed for decompression if vomiting or persistently nauseous   - Zofran 4 mg Q4Hr prn Nausea and/or vomiting  - Multimodal Pain Regimen: Tylenol for mild pain, Dilaudid 0.2 mg IV  for moderate pain, Dilaudid 0.4 mg IV  for severe pain, Dilaudid 0.6 mg IV  for breakthrough pain   - Surgery consulted  - Repeat KUB in 6 to 24 Hr  - Monitor for clinical deterioration; can be indicated by fever, tachycardia, increasing abdominal pain or distension, acidosis, or leukocytosis.   - will hold off on steroids at this time given less suspicion for active crohns flare given no bloody diarrhea or weight loss. Pt has previously refused high dose steroids as she doesn't like the \"feeling of it\".      Chronic Medical Issues   # FRED/MDD, PTSD  # fibromyalgia, peripheral neuropathy  # tobacco dependence: nicotine patch ordered    F: LR 100ml/ hr for 10 hrs  E: Replete as needed   N: NPO  GI ppx: None  DVT ppx: Lovenox Subq  Antibiotics: none  Tubes/Lines/Drains: PIV    Code Status: Full code  Emergency Contact: Extended Emergency Contact Information  Primary Emergency Contact: Bryn Olsen  Home Phone: [phone]  Relation: Parent  Secondary Emergency Contact: FlorencioWaKathie jonash  Home Phone: [phone]  Relation: None     Disposition: 44 y.o.female admitted for pSBO and enteritis. Estimated length of stay < 48 hrs.     Gelacio Beasley DO  PGY-2  Internal Medicine    "

## 2025-03-23 NOTE — CONSULTS
Reason For Consult  Abdominal pain    History Of Present Illness  Kym Jennings is a 44 y.o. female presenting with abdomina pain.  She has a history of Crohn's disease and is status post 2 bowel resections, the latest one 4 to 5 years ago.  She is not currently on any medical therapy for her Crohn's disease and was told that she was in remission.  She came in with abdominal pain that was generalized, with some nausea no emesis.  She is having nonbloody and watery diarrhea as well.  This is ongoing for the past 2 days or so.  She denies any fevers, chills, chest pain or other complaints.    On evaluation this morning, she is afebrile with stable hemodynamics.  She is without any leukocytosis and had a lactate of 2.2.  She denies any nausea or vomiting.  States she is passing gas and had a bowel movement yesterday.  CT scan reviewed with inflammatory changes in the distal ileum and suggestion of a stricture at the terminal ileum.     Past Medical History  She has a past medical history of Crohn's disease (Multi) and Right lower quadrant pain (05/02/2022).    Surgical History  She has a past surgical history that includes Other surgical history (03/03/2022); Other surgical history (03/03/2022); Other surgical history (03/03/2022); Other surgical history (03/03/2022); Other surgical history (03/03/2022); and Other surgical history (03/03/2022).     Social History  She reports that she has been smoking cigarettes. She has a 20 pack-year smoking history. She has never used smokeless tobacco. She reports that she does not currently use alcohol. She reports that she does not currently use drugs after having used the following drugs: Methamphetamines, Amphetamines, and Marijuana.    Family History  Family History   Problem Relation Name Age of Onset    Clotting disorder Father      Diabetes Other Paternal Relatives     Clotting disorder Other Paternal Relatives         Allergies  Metoclopramide hcl, Morphine, and  "Vancomycin    Review of Systems  Constitutional:  Negative for chills and fever.   HENT:  Negative for facial swelling, nosebleeds, rhinorrhea and sinus pain.    Respiratory:  Negative for apnea, choking and chest tightness.    Cardiovascular:  Negative for chest pain, palpitations and leg swelling.   Gastrointestinal:  Negative for nausea and vomiting.   Endocrine: Negative for polydipsia, polyphagia and polyuria.   Genitourinary:  Negative for difficulty urinating, flank pain and urgency.   Skin:  Negative for color change, pallor and rash.   Neurological:  Negative for tremors, light-headedness and numbness.   Psychiatric/Behavioral:  Negative for agitation, behavioral problems, confusion, hallucinations and self-injury.        Physical Exam  Physical Exam:   Constitutional: Well developed, awake/alert/oriented x3, no distress, alert and cooperative  Eyes: PERRL, EOMI, clear sclera  Head/Neck: Neck supple, no apparent injury, No JVD, trachea midline  Respiratory/Thorax: good chest expansion, thorax symmetric  Cardiovascular: Regular, rate and rhythm,  2+ equal pulses of the extremities  Gastrointestinal: Nondistended, soft, minimally tender, no rebound tenderness or guarding, no masses palpable  Musculoskeletal: ROM intact, no joint swelling, normal strength  Extremities: normal extremities, no cyanosis edema, contusions or wounds, no clubbing  Neurological: alert and oriented x3, intact senses,  Psychological: Appropriate mood and behavior  Skin: Warm and dry, no lesions, no rashes       Last Recorded Vitals  Blood pressure 95/60, pulse 68, temperature 36.5 °C (97.7 °F), temperature source Temporal, resp. rate 17, height 1.753 m (5' 9\"), weight 61.2 kg (135 lb), SpO2 93%.    Assessment/Plan     44-year-old female with a history of Crohn's disease, not on any medical therapy currently, presenting with abdominal pain and nausea with concern for partial bowel obstruction.  On her CT scan, she has some inflammatory " changes in the terminal ileum as well as a stricture.  This morning, she is passing gas and having bowel function.  Her abdomen is absolutely nondistended and nontender.  She may have a clear liquid diet and advance as tolerated.  She will require consultation with gastroenterology for medical management of her Crohn's disease.  No urgent or emergent surgical intervention at this time.  Please call with any questions.    I spent 60 minutes in the professional and overall care of this patient.      Chandu Singh MD

## 2025-03-23 NOTE — ED PROVIDER NOTES
HPI   Chief Complaint   Patient presents with    Flu Symptoms       History of present illness:  44-year-old female presents to the emergency room for complaints of diffuse abdominal pain and cramping going on for the past couple of days.  She has a past medical history of Crohn's disease but is not currently being treated for it she was last on an infusion treatment for it about a year and 1/2 to 2 years ago.  She does not currently see gastroenterology and does not currently have a primary care doctor.  She states that the pain has been getting gradually worse and she states that she has chronic diarrhea but states it seems to be getting worse and is foul-smelling now.  She has had some nausea but no vomiting denies any fevers or chills at this time.  She was hoping that she might just be suffering from COVID and flu and states that she has had muscle aches chills headache and loss of taste.  She denies any other symptoms in particular denies any recent antibiotic use.    Social history: Negative for alcohol and drug use.    Review of systems:   Gen.: No weight loss, fever.   Eyes: No vision loss, double vision  ENT: No pharyngitis, neck pain  Cardiac: No chest pain, palpitations, syncope, near syncope.   Pulmonary: No shortness of breath, cough, hemoptysis.   Heme/lymph: No swollen glands, fever, bleeding.   GI: No change in bowel habits, melena, hematemesis, hematochezia  : No discharge, dysuria, frequency, urgency, hematuria.   Musculoskeletal: No limb pain, joint pain, joint swelling.   Skin: No rashes.   Review of systems is otherwise negative unless stated above or in history of present illness.        Physical exam:  General: Vitals noted, no distress. Afebrile.   EENT: No lymphadenopathy appreciated  Cardiac: Regular, rate, rhythm, no murmur.   Pulmonary: Lungs clear bilaterally with good aeration. No adventitious breath sounds.   Abdomen: Soft, nonsurgical. No peritoneal signs. Normoactive bowel sounds.  Tenderness to palpation throughout the abdomen  Extremities: No peripheral edema.   Skin: No rash.   Neuro: No focal neurologic deficits        Medical decision making:   Testing: CBC CMP lipase lactate urinalysis CT scan abdomen pelvis with contrast  Treatment:   Reevaluation:   Plan:   Impression:   1.   2.                  Patient History   Past Medical History:   Diagnosis Date    Crohn's disease (Multi)     Right lower quadrant pain 2022    Abdominal pain, RLQ (right lower quadrant)     Past Surgical History:   Procedure Laterality Date    OTHER SURGICAL HISTORY  2022    Ovarian cystectomy    OTHER SURGICAL HISTORY  2022    Esophagogastroduodenoscopy    OTHER SURGICAL HISTORY  2022    Colonoscopy    OTHER SURGICAL HISTORY  2022    Small bowel resection    OTHER SURGICAL HISTORY  2022    Hysterectomy    OTHER SURGICAL HISTORY  2022     section     Family History   Problem Relation Name Age of Onset    Clotting disorder Father      Diabetes Other Paternal Relatives     Clotting disorder Other Paternal Relatives      Social History     Tobacco Use    Smoking status: Every Day     Current packs/day: 1.00     Average packs/day: 1 pack/day for 20.0 years (20.0 ttl pk-yrs)     Types: Cigarettes    Smokeless tobacco: Never   Vaping Use    Vaping status: Never Used   Substance Use Topics    Alcohol use: Not Currently    Drug use: Not Currently     Types: Methamphetamines, Amphetamines, Marijuana       Physical Exam   ED Triage Vitals [25 2328]   Temperature Heart Rate Respirations BP   36.8 °C (98.2 °F) (!) 110 18 137/85      Pulse Ox Temp src Heart Rate Source Patient Position   98 % -- -- --      BP Location FiO2 (%)     -- --       Physical Exam      ED Course & MDM                  No data recorded                                 Medical Decision Making      Procedure  Procedures   °C (98.2 °F) (!) 110 18 137/85      Pulse Ox Temp src Heart Rate Source Patient Position   98 % -- -- --      BP Location FiO2 (%)     -- --       Physical Exam      ED Course & MDM   ED Course as of 03/24/25 2258   Sun Mar 23, 2025   0055 This is a 44-year-old presenting with chief complaint of nausea vomiting abdominal pain diarrhea history of Crohn's.  Feels like she is having a Crohn's flare.  Plan will be lab work and imaging.  Will give her normal saline Bentyl and Zofran for symptomatic relief. [WL]   0125 POTASSIUM(!): 3.4 [WL]   0210 CT abdomen pelvis w IV contrast  Inflammatory changes involving several loops of distal ileum  suggesting infectious or inflammatory enteritis. The distal  ileum/terminal ileum is diffusely narrowed, likely stricture. Of  small bowel loops are mildly dilated, suggesting partial obstruction.      Interval development of diffuse osseous sclerosis. Please correlate  clinically for hyperparathyroidism. Additional considerations include  systemic diseases such as leukemia/lymphoma, anemias, myelofibrosis  (although these may be less likely as the spleen is normal in size).      Mild hepatomegaly.   [WL]   0220 Reevaluated at bedside.  States she currently is not nauseous but still in pain.  Requesting Dilaudid [WL]   0238 Spoke with Sommer willard for hospitalist service who agrees on admission [WL]   0238 \ [WL]      ED Course User Index  [WL] Carlitos Kent DO         Diagnoses as of 03/24/25 2258   Abdominal pain, unspecified abdominal location   History of Crohn's disease   Partial intestinal obstruction, unspecified cause (Multi)   Disorder of intestine                 No data recorded                                 Medical Decision Making      Procedure  Procedures     Steven Godwin PA-C  03/24/25 2259

## 2025-03-23 NOTE — CARE PLAN
The patient's goals for the shift include      The clinical goals for the shift include adequate pain control    Problem: Pain - Adult  Goal: Verbalizes/displays adequate comfort level or baseline comfort level  Outcome: Progressing     Problem: Safety - Adult  Goal: Free from fall injury  Outcome: Progressing     Problem: Chronic Conditions and Co-morbidities  Goal: Patient's chronic conditions and co-morbidity symptoms are monitored and maintained or improved  Outcome: Progressing     Problem: Nutrition  Goal: Nutrient intake appropriate for maintaining nutritional needs  Outcome: Progressing

## 2025-03-23 NOTE — PROGRESS NOTES
03/23/25 0718   Discharge Planning   Living Arrangements Parent  (Home with mother)   Support Systems Parent   Assistance Needed A&OX4; independent with ADLs with no DME; doesn't drive; room air baseline and currently room air; no current PCP   Type of Residence Private residence   Number of Stairs to Enter Residence 4   Number of Stairs Within Residence 14  (14 down to basement)   Do you have animals or pets at home? Yes   Type of Animals or Pets 1 cat and 2 birds   Who is requesting discharge planning? Provider   Expected Discharge Disposition Home  (Pending workup but likely home no needs)   Financial Resource Strain   How hard is it for you to pay for the very basics like food, housing, medical care, and heating? Somewhat   Housing Stability   In the last 12 months, was there a time when you were not able to pay the mortgage or rent on time? N   In the past 12 months, how many times have you moved where you were living? 2   At any time in the past 12 months, were you homeless or living in a shelter (including now)? N   Transportation Needs   In the past 12 months, has lack of transportation kept you from medical appointments or from getting medications? no   In the past 12 months, has lack of transportation kept you from meetings, work, or from getting things needed for daily living? No

## 2025-03-24 PROBLEM — E83.42 HYPOMAGNESEMIA: Status: RESOLVED | Noted: 2025-03-24 | Resolved: 2025-03-24

## 2025-03-24 PROBLEM — F11.10 OPIOID ABUSE: Status: ACTIVE | Noted: 2023-03-16

## 2025-03-24 PROBLEM — F19.10: Status: ACTIVE | Noted: 2025-03-24

## 2025-03-24 PROBLEM — R79.89 ELEVATED LACTIC ACID LEVEL: Status: RESOLVED | Noted: 2025-03-24 | Resolved: 2025-03-24

## 2025-03-24 PROBLEM — R45.851 SUICIDAL IDEATION: Status: RESOLVED | Noted: 2025-03-24 | Resolved: 2025-03-24

## 2025-03-24 PROBLEM — R19.00 ABDOMINAL SWELLING: Status: RESOLVED | Noted: 2025-03-24 | Resolved: 2025-03-24

## 2025-03-24 PROBLEM — N12 PYELONEPHRITIS: Status: RESOLVED | Noted: 2025-03-24 | Resolved: 2025-03-24

## 2025-03-24 PROBLEM — K56.7 ILEUS DUE TO INFECTION (MULTI): Status: RESOLVED | Noted: 2025-03-24 | Resolved: 2025-03-24

## 2025-03-24 PROBLEM — J11.1 INFLUENZA: Status: RESOLVED | Noted: 2025-03-24 | Resolved: 2025-03-24

## 2025-03-24 PROBLEM — F11.23 OPIOID DEPENDENCE WITH WITHDRAWAL (MULTI): Status: ACTIVE | Noted: 2018-12-01

## 2025-03-24 PROBLEM — E55.9 VITAMIN D DEFICIENCY: Status: RESOLVED | Noted: 2025-03-24 | Resolved: 2025-03-24

## 2025-03-24 PROBLEM — R60.0 EDEMA OF LOWER EXTREMITY: Status: RESOLVED | Noted: 2025-03-24 | Resolved: 2025-03-24

## 2025-03-24 PROBLEM — F17.200 TOBACCO DEPENDENCE: Status: ACTIVE | Noted: 2025-03-24

## 2025-03-24 PROBLEM — S09.90XA INJURY OF HEAD: Status: RESOLVED | Noted: 2025-03-24 | Resolved: 2025-03-24

## 2025-03-24 PROBLEM — F43.12 CHRONIC POST-TRAUMATIC STRESS DISORDER (PTSD): Status: ACTIVE | Noted: 2023-03-16

## 2025-03-24 PROBLEM — F12.10 CANNABIS USE DISORDER, MILD, ABUSE: Status: RESOLVED | Noted: 2025-03-24 | Resolved: 2025-03-24

## 2025-03-24 PROBLEM — F15.21: Status: RESOLVED | Noted: 2025-03-24 | Resolved: 2025-03-24

## 2025-03-24 PROBLEM — R07.9 CHEST PAIN: Status: ACTIVE | Noted: 2023-12-07

## 2025-03-24 PROBLEM — R14.0 ABDOMINAL DISTENSION: Status: RESOLVED | Noted: 2025-03-24 | Resolved: 2025-03-24

## 2025-03-24 PROBLEM — K50.819 CROHN'S DISEASE OF SMALL AND LARGE INTESTINES WITH COMPLICATION (MULTI): Status: ACTIVE | Noted: 2023-03-16

## 2025-03-24 PROBLEM — F33.2 SEVERE EPISODE OF RECURRENT MAJOR DEPRESSIVE DISORDER, WITHOUT PSYCHOTIC FEATURES (MULTI): Status: ACTIVE | Noted: 2025-03-24

## 2025-03-24 PROBLEM — B99.9 ILEUS DUE TO INFECTION (MULTI): Status: RESOLVED | Noted: 2025-03-24 | Resolved: 2025-03-24

## 2025-03-24 PROBLEM — E87.6 HYPOKALEMIA: Status: RESOLVED | Noted: 2025-03-24 | Resolved: 2025-03-24

## 2025-03-24 PROBLEM — K63.9 DISORDER OF INTESTINE: Status: ACTIVE | Noted: 2025-03-23

## 2025-03-24 PROBLEM — R14.1 ABDOMINAL GAS PAIN: Status: ACTIVE | Noted: 2023-03-16

## 2025-03-24 PROBLEM — B86 INFESTATION BY SARCOPTES SCABIEI: Status: RESOLVED | Noted: 2025-03-24 | Resolved: 2025-03-24

## 2025-03-24 PROBLEM — F15.20 METHAMPHETAMINE USE DISORDER, MODERATE, DEPENDENCE: Status: RESOLVED | Noted: 2025-03-24 | Resolved: 2025-03-24

## 2025-03-24 PROBLEM — F41.0 PANIC DISORDER WITHOUT AGORAPHOBIA WITH SEVERE PANIC ATTACKS: Status: ACTIVE | Noted: 2025-03-24

## 2025-03-24 LAB
ALBUMIN SERPL BCP-MCNC: 3 G/DL (ref 3.4–5)
ANION GAP SERPL CALC-SCNC: 10 MMOL/L (ref 10–20)
ATRIAL RATE: 65 BPM
BUN SERPL-MCNC: 6 MG/DL (ref 6–23)
CALCIUM SERPL-MCNC: 7.9 MG/DL (ref 8.6–10.3)
CHLORIDE SERPL-SCNC: 109 MMOL/L (ref 98–107)
CO2 SERPL-SCNC: 23 MMOL/L (ref 21–32)
CREAT SERPL-MCNC: 0.62 MG/DL (ref 0.5–1.05)
EGFRCR SERPLBLD CKD-EPI 2021: >90 ML/MIN/1.73M*2
ERYTHROCYTE [DISTWIDTH] IN BLOOD BY AUTOMATED COUNT: 12.7 % (ref 11.5–14.5)
GLUCOSE SERPL-MCNC: 74 MG/DL (ref 74–99)
HCT VFR BLD AUTO: 35.5 % (ref 36–46)
HGB BLD-MCNC: 11.9 G/DL (ref 12–16)
HOLD SPECIMEN: NORMAL
MAGNESIUM SERPL-MCNC: 1.52 MG/DL (ref 1.6–2.4)
MCH RBC QN AUTO: 31.6 PG (ref 26–34)
MCHC RBC AUTO-ENTMCNC: 33.5 G/DL (ref 32–36)
MCV RBC AUTO: 94 FL (ref 80–100)
NRBC BLD-RTO: 0 /100 WBCS (ref 0–0)
P AXIS: 59 DEGREES
P OFFSET: 175 MS
P ONSET: 129 MS
PHOSPHATE SERPL-MCNC: 3.4 MG/DL (ref 2.5–4.9)
PLATELET # BLD AUTO: 258 X10*3/UL (ref 150–450)
POTASSIUM SERPL-SCNC: 4.2 MMOL/L (ref 3.5–5.3)
PR INTERVAL: 192 MS
Q ONSET: 225 MS
QRS COUNT: 11 BEATS
QRS DURATION: 84 MS
QT INTERVAL: 386 MS
QTC CALCULATION(BAZETT): 401 MS
QTC FREDERICIA: 396 MS
R AXIS: 43 DEGREES
RBC # BLD AUTO: 3.76 X10*6/UL (ref 4–5.2)
SODIUM SERPL-SCNC: 138 MMOL/L (ref 136–145)
T AXIS: 57 DEGREES
T OFFSET: 418 MS
VENTRICULAR RATE: 65 BPM
WBC # BLD AUTO: 6.3 X10*3/UL (ref 4.4–11.3)

## 2025-03-24 PROCEDURE — 94760 N-INVAS EAR/PLS OXIMETRY 1: CPT

## 2025-03-24 PROCEDURE — 2500000002 HC RX 250 W HCPCS SELF ADMINISTERED DRUGS (ALT 637 FOR MEDICARE OP, ALT 636 FOR OP/ED)

## 2025-03-24 PROCEDURE — 2500000004 HC RX 250 GENERAL PHARMACY W/ HCPCS (ALT 636 FOR OP/ED)

## 2025-03-24 PROCEDURE — 1200000002 HC GENERAL ROOM WITH TELEMETRY DAILY

## 2025-03-24 PROCEDURE — 80069 RENAL FUNCTION PANEL: CPT

## 2025-03-24 PROCEDURE — 2500000004 HC RX 250 GENERAL PHARMACY W/ HCPCS (ALT 636 FOR OP/ED): Mod: JW

## 2025-03-24 PROCEDURE — 36415 COLL VENOUS BLD VENIPUNCTURE: CPT

## 2025-03-24 PROCEDURE — 83735 ASSAY OF MAGNESIUM: CPT

## 2025-03-24 PROCEDURE — 99232 SBSQ HOSP IP/OBS MODERATE 35: CPT

## 2025-03-24 PROCEDURE — S4991 NICOTINE PATCH NONLEGEND: HCPCS

## 2025-03-24 PROCEDURE — 2500000001 HC RX 250 WO HCPCS SELF ADMINISTERED DRUGS (ALT 637 FOR MEDICARE OP)

## 2025-03-24 PROCEDURE — 87506 IADNA-DNA/RNA PROBE TQ 6-11: CPT | Mod: GEALAB

## 2025-03-24 PROCEDURE — 99253 IP/OBS CNSLTJ NEW/EST LOW 45: CPT | Performed by: INTERNAL MEDICINE

## 2025-03-24 PROCEDURE — 85027 COMPLETE CBC AUTOMATED: CPT

## 2025-03-24 RX ORDER — ACETAMINOPHEN 325 MG/1
975 TABLET ORAL EVERY 8 HOURS
Status: DISCONTINUED | OUTPATIENT
Start: 2025-03-24 | End: 2025-03-25 | Stop reason: HOSPADM

## 2025-03-24 RX ORDER — QUETIAPINE FUMARATE 100 MG/1
200 TABLET, FILM COATED ORAL NIGHTLY PRN
Status: DISCONTINUED | OUTPATIENT
Start: 2025-03-24 | End: 2025-03-24

## 2025-03-24 RX ORDER — SODIUM CHLORIDE, SODIUM LACTATE, POTASSIUM CHLORIDE, CALCIUM CHLORIDE 600; 310; 30; 20 MG/100ML; MG/100ML; MG/100ML; MG/100ML
125 INJECTION, SOLUTION INTRAVENOUS CONTINUOUS
Status: CANCELLED | OUTPATIENT
Start: 2025-03-24 | End: 2025-03-25

## 2025-03-24 RX ORDER — QUETIAPINE FUMARATE 25 MG/1
50 TABLET, FILM COATED ORAL NIGHTLY PRN
Status: DISCONTINUED | OUTPATIENT
Start: 2025-03-24 | End: 2025-03-25 | Stop reason: HOSPADM

## 2025-03-24 RX ORDER — OXYCODONE HYDROCHLORIDE 5 MG/1
5 TABLET ORAL EVERY 6 HOURS PRN
Status: DISCONTINUED | OUTPATIENT
Start: 2025-03-24 | End: 2025-03-25 | Stop reason: HOSPADM

## 2025-03-24 RX ORDER — OXYCODONE HYDROCHLORIDE 5 MG/1
2.5 TABLET ORAL EVERY 6 HOURS PRN
Status: DISCONTINUED | OUTPATIENT
Start: 2025-03-24 | End: 2025-03-25 | Stop reason: HOSPADM

## 2025-03-24 RX ORDER — MAGNESIUM SULFATE HEPTAHYDRATE 40 MG/ML
2 INJECTION, SOLUTION INTRAVENOUS ONCE
Status: COMPLETED | OUTPATIENT
Start: 2025-03-24 | End: 2025-03-24

## 2025-03-24 RX ADMIN — ESCITALOPRAM OXALATE 20 MG: 20 TABLET ORAL at 10:01

## 2025-03-24 RX ADMIN — SODIUM CHLORIDE, POTASSIUM CHLORIDE, SODIUM LACTATE AND CALCIUM CHLORIDE 1000 ML: 600; 310; 30; 20 INJECTION, SOLUTION INTRAVENOUS at 10:27

## 2025-03-24 RX ADMIN — ACETAMINOPHEN 975 MG: 325 TABLET ORAL at 15:18

## 2025-03-24 RX ADMIN — GABAPENTIN 300 MG: 300 CAPSULE ORAL at 20:35

## 2025-03-24 RX ADMIN — DULOXETINE HYDROCHLORIDE 60 MG: 60 CAPSULE, DELAYED RELEASE ORAL at 10:01

## 2025-03-24 RX ADMIN — HYDROMORPHONE HYDROCHLORIDE 0.4 MG: 1 INJECTION, SOLUTION INTRAMUSCULAR; INTRAVENOUS; SUBCUTANEOUS at 06:13

## 2025-03-24 RX ADMIN — GABAPENTIN 300 MG: 300 CAPSULE ORAL at 10:01

## 2025-03-24 RX ADMIN — NICOTINE 1 PATCH: 14 PATCH, EXTENDED RELEASE TRANSDERMAL at 10:01

## 2025-03-24 RX ADMIN — MAGNESIUM SULFATE HEPTAHYDRATE 2 G: 40 INJECTION, SOLUTION INTRAVENOUS at 08:39

## 2025-03-24 RX ADMIN — BUSPIRONE HYDROCHLORIDE 30 MG: 15 TABLET ORAL at 10:01

## 2025-03-24 RX ADMIN — GABAPENTIN 300 MG: 300 CAPSULE ORAL at 15:18

## 2025-03-24 RX ADMIN — BUPROPION HYDROCHLORIDE 150 MG: 150 TABLET, FILM COATED, EXTENDED RELEASE ORAL at 10:01

## 2025-03-24 ASSESSMENT — PAIN DESCRIPTION - ORIENTATION: ORIENTATION: MID

## 2025-03-24 ASSESSMENT — PAIN - FUNCTIONAL ASSESSMENT
PAIN_FUNCTIONAL_ASSESSMENT: 0-10
PAIN_FUNCTIONAL_ASSESSMENT: 0-10

## 2025-03-24 ASSESSMENT — COGNITIVE AND FUNCTIONAL STATUS - GENERAL
DAILY ACTIVITIY SCORE: 24
MOBILITY SCORE: 24

## 2025-03-24 ASSESSMENT — ENCOUNTER SYMPTOMS
NAUSEA: 1
ABDOMINAL PAIN: 1
DIARRHEA: 1

## 2025-03-24 ASSESSMENT — PAIN SCALES - GENERAL
PAINLEVEL_OUTOF10: 8
PAINLEVEL_OUTOF10: 2
PAINLEVEL_OUTOF10: 0 - NO PAIN

## 2025-03-24 ASSESSMENT — ACTIVITIES OF DAILY LIVING (ADL): LACK_OF_TRANSPORTATION: NO

## 2025-03-24 ASSESSMENT — PAIN DESCRIPTION - LOCATION: LOCATION: ABDOMEN

## 2025-03-24 NOTE — PROGRESS NOTES
Pharmacy Medication History Review    Kym Jennings is a 44 y.o. female admitted for Disorder of intestine. Pharmacy reviewed the patient's [id number] medications and allergies for accuracy.    The list below reflectives the updated PTA list. Please review each medication in order reconciliation for additional clarification and justification.  No medications prior to admission.        The list below reflectives the updated allergy list. Please review each documented allergy for additional clarification and justification.  Allergies  Reviewed by Sandy Wong RN on 3/22/2025        Severity Reactions Comments    Metoclopramide Hcl Not Specified Unknown     Morphine Low Rash     Vancomycin Low Rash               Zara Rodriguez, PharmD

## 2025-03-24 NOTE — HOSPITAL COURSE
HPI:  Kym Jennings is a 44 y.o. female w/ PMH of OUD, anxiety, insomnia, fibromyalgia, chronic pancreatitis s/p stent, and crohn's disease (s/p 2 bowel resections, last one 3-4 yrs ago, not currently on any therapy) who presented to the hospital for abdominal pain that began 2 days ago.  She states abdominal pain is in the center region of the upper and lower abdomen.  She states it is constant and denies any significant alleviating factors.  She has not tried anything for the pain at home.  She does also have associated nausea however no episodes of vomiting, nonbloody and watery diarrhea occurring almost every hour, and a headache. Decreased appetite and poor oral intake for the past 2 days.  SDenies any significant weight loss recently.     Of note, patient has had multiple admissions in the past due to similar symptoms and was found to have partial small bowel obstruction versus Crohn's flare. She has been seen by GI previously however does not have consistent follow-up.  She was previously trialed on several immunotherapies for Crohn's disease, last ones being Stelara infusion and skrizi. Last appointment with GI was in February 2024 and was lost to follow up. Last colonoscopy was in 08/2022 which showed neoterminal ileum which had chronic and stable ulcers. No strictures or stenosis noted. She currently resides at home with mom. She denies any current substance use.      ED course:   Vitals: Afebrile, , RR 18, /85, 98% on room air     Labs:   -CBC -unremarkable  -CMP -glucose 113, , K 3.4, calcium 8.4, otherwise unremarkable  -Lipase: 68  -Lactate: 2.2     Imaging:   -CT A/P: Inflammatory changes involving several loops of distal ileum suggesting infectious or inflammatory enteritis.  The distal ileum terminal ileum is diffusely narrowed likely stricture.  Small bowel loops are mildly dilated suggesting partial obstruction.  Interval development of diffuse osseous sclerosis.     Micro:   -  COVID negative  - influenza A/B negative  -RSV negative     Interventions: Bentyl 20 mg, Dilaudid 1 mg, Zofran 4 mg, potassium chloride 40, NS 1 L    Hospital Course:  Upon admission to general floors, there was low suspicion of abdominal pain being 2/2 Crohn's as there was no bloody diarrhea and reported weight loss by patient. Pt reported abdominal pain being different to last abdominal pain that was 2/2 Crohn's. Pt was monitored overnight. Surgery consulted, no surgical intervention indicated and recommended to advance patient to clear liquid diet. Patient tolerated clear liquids well, continued to advance to soft diet and patient overall tolerated well. GI consulted for possible Crohn's flare, started on budesonide 6 mg daily and referral placed for outpatient follow up to resume biologic therapy for Crohn's.    Urine culture obtained at admission did grow gram negative bacilli, patient was started on Bactrim for 3 day course as she did admit to dysuria that started 2-3 days prior to admission.    As patient continued to tolerate diet with formed bowel movements, discharged home in stable conditioned on 3/25/25. Referral was placed for GI follow up outpatient as well as follow up with PCP. Discharge plan and instructions were discussed with patient who related understanding and was agreeable.

## 2025-03-24 NOTE — PROGRESS NOTES
Internal Medicine - Daily Progress Note   Hospital Day: 3       Name:Kym Jennings, AGE: 44 y.o., GENDER: female, MRN: [id number], ROOM: 223/223-A   CODE STATUS: Full Code  Attending Physician: Jt Brady MD  Resident: Eve Pham MD        Chief Complaint     Chief Complaint   Patient presents with    Flu Symptoms        Subjective    Kym Jennings is a 44 y.o. year old female patient on Hospital Day: 3    Overnight events: No acute events overnight - patient was evaluated by surgery yesterday who stated no need for acute surgical intervention and recommended diet be advanced to clear liquid diet, patient tolerated this well overnight.    Patient seen and examined at bedside this morning. States that she is feeling improved today in terms of abdominal pain. Has been tolerating clear liquid diet well with no nausea or vomiting. She did have a bowel movement this morning that was more formed than prior bowel movements.      Meds    Scheduled medications  gabapentin, 300 mg, oral, TID  nicotine, 1 patch, transdermal, Daily      Continuous medications     PRN medications  PRN medications: acetaminophen, diclofenac sodium, HYDROmorphone, HYDROmorphone, HYDROmorphone, QUEtiapine     Objective    Physical Exam  Constitutional:       General: She is not in acute distress.  HENT:      Head: Normocephalic and atraumatic.   Cardiovascular:      Rate and Rhythm: Normal rate and regular rhythm.   Pulmonary:      Effort: Pulmonary effort is normal. No respiratory distress.      Breath sounds: Normal breath sounds. No wheezing or rales.   Abdominal:      General: Bowel sounds are normal. There is no distension.      Palpations: Abdomen is soft.      Tenderness: There is abdominal tenderness (Mild diffuse tenderness throughout the abdomen).   Musculoskeletal:      Right lower leg: No edema.      Left lower leg: No edema.   Skin:     General: Skin is warm and dry.   Neurological:      General: No focal deficit present.     "  Mental Status: She is alert.   Psychiatric:         Mood and Affect: Mood normal.         Behavior: Behavior normal.        Visit Vitals  BP (!) 82/48   Pulse 54   Temp 36.6 °C (97.9 °F)   Resp 20   Ht 1.753 m (5' 9\")   Wt 61.2 kg (135 lb)   SpO2 91%   BMI 19.94 kg/m²   OB Status Hysterectomy   Smoking Status Every Day   BSA 1.73 m²        Intake/Output Summary (Last 24 hours) at 3/24/2025 1157  Last data filed at 3/24/2025 1147  Gross per 24 hour   Intake 2538.34 ml   Output 900 ml   Net 1638.34 ml       Labs:   Results from last 72 hours   Lab Units 03/24/25  0643 03/23/25  0647 03/23/25  0053   SODIUM mmol/L 138 138 138   POTASSIUM mmol/L 4.2 4.0 3.4*   CHLORIDE mmol/L 109* 108* 104   CO2 mmol/L 23 22 21   BUN mg/dL 6 6 7   CREATININE mg/dL 0.62 0.53 0.64   GLUCOSE mg/dL 74 72* 113*   CALCIUM mg/dL 7.9* 7.9* 8.4*   ANION GAP mmol/L 10 12 16   EGFR mL/min/1.73m*2 >90 >90 >90   PHOSPHORUS mg/dL 3.4 3.9  --       Results from last 72 hours   Lab Units 03/24/25  0644 03/23/25  0647 03/23/25  0053   WBC AUTO x10*3/uL 6.3 7.6 9.2   HEMOGLOBIN g/dL 11.9* 12.7 13.7   HEMATOCRIT % 35.5* 37.2 41.0   PLATELETS AUTO x10*3/uL 258 266 363   NEUTROS PCT AUTO %  --   --  75.7   LYMPHS PCT AUTO %  --   --  17.4   MONOS PCT AUTO %  --   --  5.3   EOS PCT AUTO %  --   --  0.7      Lab Results   Component Value Date    CALCIUM 7.9 (L) 03/24/2025    PHOS 3.4 03/24/2025      Lab Results   Component Value Date    CRP 0.93 02/09/2024       [unfilled]     Micro/ID:   No results found for the last 90 days.                   No lab exists for component: \"AGALPCRNB\"     Lab Results   Component Value Date    URINECULTURE No growth 11/28/2023       Images    CT abdomen pelvis w IV contrast    Result Date: 3/23/2025  Inflammatory changes involving several loops of distal ileum suggesting infectious or inflammatory enteritis. The distal ileum/terminal ileum is diffusely narrowed, likely stricture. Of small bowel loops are mildly dilated, " suggesting partial obstruction.   Interval development of diffuse osseous sclerosis. Please correlate clinically for hyperparathyroidism. Additional considerations include systemic diseases such as leukemia/lymphoma, anemias, myelofibrosis (although these may be less likely as the spleen is normal in size).   Mild hepatomegaly.   MACRO: None   Signed by: Kathe Aleman 3/23/2025 2:04 AM Dictation workstation:   [id number]     Assessment and Plan    Kym Jennings is a 44 y.o. female with PMHx significant for OUD, nicotine dependence, anxiety, insomnia, fibromyalgia, chronic pancreatitis s/p stent, and crohn's disease (s/p 2 bowel resections, last one 3-4 yrs ago, not currently on any therapy) admitted on 3/22/2025 for abdominal pain 2/2 partial SBO iso gastroenteritis vs crohn's flare vs stricture.     ACUTE MEDICAL ISSUES:  # Abdominal pain 2/2 pSBO d/t stricture with possible enteritis vs crohn's flare  ::Hx of crohns' disease s/p bowel resections - not currently on any therapy  ::CT abdomen: Inflammatory changes involving several loops of distal ileum suggesting infectious or inflammatory enteritis. The distal ileum/terminal ileum is diffusely narrowed, likely stricture. Of small bowel loops are mildly dilated, suggesting partial obstruction.  PLAN:  -Surgery consulted, no acute surgical intervention - recommended advancing diet to clears   -Clear liquid diet - advance as tolerated   -Stool pathogen PCR pending to rule out gastroenteritis as cause   -Zofran 4 mg Q4Hr prn Nausea and/or vomiting  -Pain Regimen: Tylenol for mild pain, oxycodone 2.5 and 5 mg q6 hours PRN for moderate and severe pain, dilaudid 0.5 mg IV q3 hours PRN for breakthrough pain   -Given inflammatory changes on CT abdomen and hx Crohn's - will consult GI, appreciate recs   -Hold steroids as no bloody BM and no weight loss  -Patient is not able to make it downtown to see her outpatient GI doctor and will likely need to re-establish care  closer to home     #Hypotension  ::Patient hypotensive to 82/48, patient notes BP does run low at baseline  ::Suspect 2/2 medications - patient started on all home meds but admitted she has not taken any meds for months  PLAN:  -Clonidine and spironolactone discontinued  -500cc bolus LR followed by 100cc/hr x5 hours  -Continue to monitor blood pressure       ADDRESSED/ RESOLVED MEDICAL ISSUES:        CHRONIC MEDICAL ISSUES:  #Nicotine use disorder  -Nicotine patch ordered    #Fibromyalgia  -Continue home gabapentin 300 mg TID    #Anxiety  #Insomnia  -Patient has not been taking any of her home meds for months - will not order   -Seroquel 200 mg nightly PRN      Fluids: 1L LR today - PRN  Electrolytes: Replete PRN  Nutrition: Clear liquid diet - advance as tolerated  Antimicrobials: None  DVT ppx: SCDs  GI ppx: None  Catheter: None  Lines: PIVs  Supplemental Oxygen: Room Air   Emergency Contact: Extended Emergency Contact Information  Primary Emergency Contact: Bryn Olsen  Home Phone: [phone]  Relation: Parent  Secondary Emergency Contact: Rowena Gutierrez  Home Phone: [phone]  Relation: None   Code: Full Code     Disposition: 44 year old female admitted with abdominal pain in the setting of partial obstruction 2/2 stricture with possible gastroenteritis vs Crohn's flare. Pending GI recs and patient ability to tolerate a diet. Anticipate likely discharge tomorrow.      Eve Pham MD  Internal Medicine, PGY- 1  03/24/25 at 11:57 AM

## 2025-03-24 NOTE — PROGRESS NOTES
03/24/25 0902   Discharge Planning   Living Arrangements Parent  (Home with mother)   Support Systems Parent   Assistance Needed Alert andoriented x 3; Independent with ADL's, No DME used at home; Doesn't drive; Room air baseline and currently room air; No current PCP   Type of Residence Private residence   Number of Stairs to Enter Residence 4   Number of Stairs Within Residence 14  (14 steps to the basement)   Do you have animals or pets at home? Yes   Type of Animals or Pets 1 cat and 2 birds   Who is requesting discharge planning? Provider   Home or Post Acute Services None   Expected Discharge Disposition Home  (Patient denying any discharge needs at this time.)   Does the patient need discharge transport arranged? No   Financial Resource Strain   How hard is it for you to pay for the very basics like food, housing, medical care, and heating? Somewhat   Housing Stability   In the last 12 months, was there a time when you were not able to pay the mortgage or rent on time? N   In the past 12 months, how many times have you moved where you were living? 2   At any time in the past 12 months, were you homeless or living in a shelter (including now)? N   Transportation Needs   In the past 12 months, has lack of transportation kept you from medical appointments or from getting medications? no   In the past 12 months, has lack of transportation kept you from meetings, work, or from getting things needed for daily living? No   Patient Choice   Provider Choice list and CMS website (https://medicare.gov/care-compare#search) for post-acute Quality and Resource Measure Data were provided and reviewed with: Family;Patient   Patient / Family choosing to utilize agency / facility established prior to hospitalization No   Stroke Family Assessment   Stroke Family Assessment Needed No   Intensity of Service   Intensity of Service 0-30 min
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
## 2025-03-24 NOTE — CONSULTS
Inpatient consult to Gastroenterology  Consult performed by: Ginny Mosqueda MD  Consult ordered by: Jt Brady MD  Reason for consult: Crohn disease flareup  Assessment/Recommendations: See assessment and plan          Reason For Consult  Crohn's flare up  History Of Present Illness  Kym Jennings is a 44 y.o. female with history of Crohn's (diagnosed at age 26 )with complication SP surgery twice, bowel resection with ileocolonic anastomosis, last surgery in 2017, anxiety, insomnia, fibromyalgia admitted with 2-day history of abdominal pain, diarrhea with nausea.  CT scan abdomen/pelvis:Inflammatory changes involving several loops of distal ileum  suggesting infectious or inflammatory enteritis. The distal ileum/terminal ileum is diffusely narrowed, likely stricture.  Normal pancreas.  Multiple admission with similar symptom and findings.  Was treated in the past with different agents including infliximab, Humira, Cimzia, Stelara, Entyvio.  Also was started on Skyrizi since 10/2022 which subsequently discontinued.  Compliance was an  issues.  She wants to follow-up with  local GI.  Currently she feels better, no abdominal distention, fever.  Diarrhea is improved.  She had bowel movement this morning  She was seen by Dr. Nam on 2/6/2024, also seen by Dr. Brown .  She has been smoking at least 10 cigarettes/day for last 10 years.  Lab: Hemoglobin 11.9, WBC 6.3, magnesium 1.52, calcium 7.9 otherwise unremarkable BMP.  LFTs within normal limit     Past Medical History  She has a past medical history of Abdominal distension (03/24/2025), Abdominal swelling (03/24/2025), Adderall use disorder, moderate, in sustained remission (Multi) (03/24/2025), Cannabis use disorder, mild, abuse (03/24/2025), Crohn's disease (Multi), Edema of lower extremity (03/24/2025), Elevated lactic acid level (03/24/2025), Hypokalemia (03/24/2025), Hypomagnesemia (03/24/2025), Ileus due to infection (Multi) (03/24/2025), Influenza  (03/24/2025), Injury of head (03/24/2025), Methamphetamine use disorder, moderate, dependence (Multi) (03/24/2025), Pyelonephritis (03/24/2025), Right lower quadrant pain (05/02/2022), Suicidal ideation (03/24/2025), and Vitamin D deficiency (03/24/2025).    Surgical History  She has a past surgical history that includes Other surgical history (03/03/2022); Other surgical history (03/03/2022); Other surgical history (03/03/2022); Other surgical history (03/03/2022); Other surgical history (03/03/2022); and Other surgical history (03/03/2022).     Social History  She reports that she has been smoking cigarettes. She has a 20 pack-year smoking history. She has never used smokeless tobacco. She reports that she does not currently use alcohol. She reports that she does not currently use drugs after having used the following drugs: Methamphetamines, Amphetamines, and Marijuana.    Family History  Family History   Problem Relation Name Age of Onset    Clotting disorder Father      Diabetes Other Paternal Relatives     Clotting disorder Other Paternal Relatives         Allergies  Metoclopramide hcl, Morphine, and Vancomycin    Review of Systems   Gastrointestinal:  Positive for abdominal pain, diarrhea and nausea.        Physical Exam  HENT:      Mouth/Throat:      Mouth: Mucous membranes are moist.   Eyes:      General: No scleral icterus.  Cardiovascular:      Rate and Rhythm: Regular rhythm. Bradycardia present.   Pulmonary:      Effort: Pulmonary effort is normal. No respiratory distress.   Abdominal:      General: Abdomen is flat. Bowel sounds are normal. There is no distension.      Palpations: Abdomen is soft.      Tenderness: There is no abdominal tenderness.      Comments: Healed scar   Musculoskeletal:      Right lower leg: No edema.      Left lower leg: No edema.   Neurological:      Mental Status: She is alert and oriented to person, place, and time.          Last Recorded Vitals  Blood pressure (!) 82/48,  "pulse 54, temperature 36.6 °C (97.9 °F), resp. rate 20, height 1.753 m (5' 9\"), weight 61.2 kg (135 lb), SpO2 91%.    Relevant Results           Assessment/Plan   Ileocolonic Crohn's SP bowel resection-was on multiple medication, was noncompliant.  Partial bowel obstruction likely related to underlying Crohn's-improved        May start budesonide 6 mg daily.  Restart Skyrizi as outpatient.  Watch clinically  Liquid diet.  Hydration.  Must need smoking cessation.  Correct hypomagnesemia  Follow-up with GI as outpt              "

## 2025-03-24 NOTE — CARE PLAN
Problem: Chronic Conditions and Co-morbidities  Goal: Patient's chronic conditions and co-morbidity symptoms are monitored and maintained or improved  Outcome: Progressing    The clinical goals for the shift include Tolerate oral intake throughout shift    Continue to advance diet and monitor for GI issues.

## 2025-03-25 ENCOUNTER — PHARMACY VISIT (OUTPATIENT)
Dept: PHARMACY | Facility: CLINIC | Age: 45
End: 2025-03-25
Payer: MEDICAID

## 2025-03-25 VITALS
TEMPERATURE: 98.1 F | RESPIRATION RATE: 16 BRPM | WEIGHT: 135 LBS | SYSTOLIC BLOOD PRESSURE: 124 MMHG | BODY MASS INDEX: 19.99 KG/M2 | DIASTOLIC BLOOD PRESSURE: 71 MMHG | HEIGHT: 69 IN | OXYGEN SATURATION: 92 % | HEART RATE: 68 BPM

## 2025-03-25 LAB
ALBUMIN SERPL BCP-MCNC: 3.1 G/DL (ref 3.4–5)
ANION GAP SERPL CALC-SCNC: 9 MMOL/L (ref 10–20)
BUN SERPL-MCNC: 6 MG/DL (ref 6–23)
C COLI+JEJ+UPSA DNA STL QL NAA+PROBE: NOT DETECTED
CALCIUM SERPL-MCNC: 8.1 MG/DL (ref 8.6–10.3)
CHLORIDE SERPL-SCNC: 105 MMOL/L (ref 98–107)
CO2 SERPL-SCNC: 25 MMOL/L (ref 21–32)
CREAT SERPL-MCNC: 0.62 MG/DL (ref 0.5–1.05)
EC STX1 GENE STL QL NAA+PROBE: NOT DETECTED
EC STX2 GENE STL QL NAA+PROBE: NOT DETECTED
EGFRCR SERPLBLD CKD-EPI 2021: >90 ML/MIN/1.73M*2
ERYTHROCYTE [DISTWIDTH] IN BLOOD BY AUTOMATED COUNT: 12.6 % (ref 11.5–14.5)
GLUCOSE SERPL-MCNC: 80 MG/DL (ref 74–99)
HCT VFR BLD AUTO: 36.9 % (ref 36–46)
HGB BLD-MCNC: 12.5 G/DL (ref 12–16)
MAGNESIUM SERPL-MCNC: 1.52 MG/DL (ref 1.6–2.4)
MCH RBC QN AUTO: 32.1 PG (ref 26–34)
MCHC RBC AUTO-ENTMCNC: 33.9 G/DL (ref 32–36)
MCV RBC AUTO: 95 FL (ref 80–100)
NOROVIRUS GI + GII RNA STL NAA+PROBE: NOT DETECTED
NRBC BLD-RTO: 0 /100 WBCS (ref 0–0)
PHOSPHATE SERPL-MCNC: 3.3 MG/DL (ref 2.5–4.9)
PLATELET # BLD AUTO: 306 X10*3/UL (ref 150–450)
POTASSIUM SERPL-SCNC: 3.7 MMOL/L (ref 3.5–5.3)
RBC # BLD AUTO: 3.9 X10*6/UL (ref 4–5.2)
RV RNA STL NAA+PROBE: NOT DETECTED
SALMONELLA DNA STL QL NAA+PROBE: NOT DETECTED
SHIGELLA DNA SPEC QL NAA+PROBE: NOT DETECTED
SODIUM SERPL-SCNC: 135 MMOL/L (ref 136–145)
V CHOLERAE DNA STL QL NAA+PROBE: NOT DETECTED
WBC # BLD AUTO: 7.6 X10*3/UL (ref 4.4–11.3)
Y ENTEROCOL DNA STL QL NAA+PROBE: NOT DETECTED

## 2025-03-25 PROCEDURE — 99232 SBSQ HOSP IP/OBS MODERATE 35: CPT | Performed by: INTERNAL MEDICINE

## 2025-03-25 PROCEDURE — 2500000002 HC RX 250 W HCPCS SELF ADMINISTERED DRUGS (ALT 637 FOR MEDICARE OP, ALT 636 FOR OP/ED)

## 2025-03-25 PROCEDURE — 36415 COLL VENOUS BLD VENIPUNCTURE: CPT

## 2025-03-25 PROCEDURE — 85027 COMPLETE CBC AUTOMATED: CPT

## 2025-03-25 PROCEDURE — 2500000001 HC RX 250 WO HCPCS SELF ADMINISTERED DRUGS (ALT 637 FOR MEDICARE OP)

## 2025-03-25 PROCEDURE — S4991 NICOTINE PATCH NONLEGEND: HCPCS

## 2025-03-25 PROCEDURE — 80069 RENAL FUNCTION PANEL: CPT

## 2025-03-25 PROCEDURE — 99239 HOSP IP/OBS DSCHRG MGMT >30: CPT

## 2025-03-25 PROCEDURE — 2500000004 HC RX 250 GENERAL PHARMACY W/ HCPCS (ALT 636 FOR OP/ED)

## 2025-03-25 PROCEDURE — 83735 ASSAY OF MAGNESIUM: CPT

## 2025-03-25 PROCEDURE — RXMED WILLOW AMBULATORY MEDICATION CHARGE

## 2025-03-25 RX ORDER — GABAPENTIN 300 MG/1
300 CAPSULE ORAL 3 TIMES DAILY
Qty: 90 CAPSULE | Refills: 0 | Status: SHIPPED | OUTPATIENT
Start: 2025-03-25 | End: 2025-04-24

## 2025-03-25 RX ORDER — MAGNESIUM SULFATE HEPTAHYDRATE 40 MG/ML
2 INJECTION, SOLUTION INTRAVENOUS ONCE
Status: COMPLETED | OUTPATIENT
Start: 2025-03-25 | End: 2025-03-25

## 2025-03-25 RX ORDER — BUDESONIDE 3 MG/1
6 CAPSULE, COATED PELLETS ORAL DAILY
Status: DISCONTINUED | OUTPATIENT
Start: 2025-03-25 | End: 2025-03-25 | Stop reason: HOSPADM

## 2025-03-25 RX ORDER — BUDESONIDE 3 MG/1
6 CAPSULE, COATED PELLETS ORAL DAILY
Qty: 60 CAPSULE | Refills: 0 | Status: SHIPPED | OUTPATIENT
Start: 2025-03-25 | End: 2025-04-24

## 2025-03-25 RX ORDER — SULFAMETHOXAZOLE AND TRIMETHOPRIM 800; 160 MG/1; MG/1
1 TABLET ORAL EVERY 12 HOURS SCHEDULED
Status: DISCONTINUED | OUTPATIENT
Start: 2025-03-25 | End: 2025-03-25 | Stop reason: HOSPADM

## 2025-03-25 RX ORDER — SULFAMETHOXAZOLE AND TRIMETHOPRIM 800; 160 MG/1; MG/1
1 TABLET ORAL EVERY 12 HOURS SCHEDULED
Qty: 6 TABLET | Refills: 0 | Status: SHIPPED | OUTPATIENT
Start: 2025-03-25 | End: 2025-03-28

## 2025-03-25 RX ADMIN — ACETAMINOPHEN 975 MG: 325 TABLET ORAL at 08:01

## 2025-03-25 RX ADMIN — SULFAMETHOXAZOLE AND TRIMETHOPRIM 1 TABLET: 800; 160 TABLET ORAL at 12:04

## 2025-03-25 RX ADMIN — MAGNESIUM SULFATE HEPTAHYDRATE 2 G: 40 INJECTION, SOLUTION INTRAVENOUS at 08:02

## 2025-03-25 RX ADMIN — NICOTINE 1 PATCH: 14 PATCH, EXTENDED RELEASE TRANSDERMAL at 08:01

## 2025-03-25 RX ADMIN — GABAPENTIN 300 MG: 300 CAPSULE ORAL at 08:01

## 2025-03-25 RX ADMIN — BUDESONIDE 6 MG: 3 CAPSULE, COATED PELLETS ORAL at 13:01

## 2025-03-25 ASSESSMENT — PAIN - FUNCTIONAL ASSESSMENT: PAIN_FUNCTIONAL_ASSESSMENT: 0-10

## 2025-03-25 ASSESSMENT — PAIN SCALES - GENERAL: PAINLEVEL_OUTOF10: 6

## 2025-03-25 NOTE — DISCHARGE SUMMARY
Discharge Diagnosis  Partial bowel obstruction with stricture in the setting of underlying Crohn's disease  Uncomplicated UTI  Hypotension    Issues Requiring Follow-Up  Partial bowel obstruction with stricture in the setting of underlying Crohn's disease  -Tolerated increased diet well - instructed to start with soft diet at home and increase as tolerated   -Started on budesonide 6 mg daily per GI rec  -Referral to GI, Dr. Mosqueda, placed for outpatient follow up and to restart Skyrizi  -To follow up with PCP as well    Uncomplicated UTI  -Bactrim x 3 days for treatment  -To follow up with PCP    Discharge Meds     Medication List      START taking these medications     budesonide EC 3 mg 24 hr capsule; Commonly known as: Entocort EC; Take 2   capsules (6 mg) by mouth once daily.   sulfamethoxazole-trimethoprim 800-160 mg tablet; Commonly known as:   Bactrim DS; Take 1 tablet by mouth every 12 hours for 6 doses.     CONTINUE taking these medications     gabapentin 300 mg capsule; Commonly known as: Neurontin; Take 1 capsule   (300 mg) by mouth 3 times a day.       Test Results Pending At Discharge  Pending Labs       Order Current Status    Urine Culture Preliminary result            Hospital Course  HPI:  Kym Jennings is a 44 y.o. female w/ PMH of OUD, anxiety, insomnia, fibromyalgia, chronic pancreatitis s/p stent, and crohn's disease (s/p 2 bowel resections, last one 3-4 yrs ago, not currently on any therapy) who presented to the hospital for abdominal pain that began 2 days ago.  She states abdominal pain is in the center region of the upper and lower abdomen.  She states it is constant and denies any significant alleviating factors.  She has not tried anything for the pain at home.  She does also have associated nausea however no episodes of vomiting, nonbloody and watery diarrhea occurring almost every hour, and a headache. Decreased appetite and poor oral intake for the past 2 days.  SDenies any  significant weight loss recently.     Of note, patient has had multiple admissions in the past due to similar symptoms and was found to have partial small bowel obstruction versus Crohn's flare. She has been seen by GI previously however does not have consistent follow-up.  She was previously trialed on several immunotherapies for Crohn's disease, last ones being Stelara infusion and skrizi. Last appointment with GI was in February 2024 and was lost to follow up. Last colonoscopy was in 08/2022 which showed neoterminal ileum which had chronic and stable ulcers. No strictures or stenosis noted. She currently resides at home with mom. She denies any current substance use.      ED course:   Vitals: Afebrile, , RR 18, /85, 98% on room air     Labs:   -CBC -unremarkable  -CMP -glucose 113, , K 3.4, calcium 8.4, otherwise unremarkable  -Lipase: 68  -Lactate: 2.2     Imaging:   -CT A/P: Inflammatory changes involving several loops of distal ileum suggesting infectious or inflammatory enteritis.  The distal ileum terminal ileum is diffusely narrowed likely stricture.  Small bowel loops are mildly dilated suggesting partial obstruction.  Interval development of diffuse osseous sclerosis.     Micro:   - COVID negative  - influenza A/B negative  -RSV negative     Interventions: Bentyl 20 mg, Dilaudid 1 mg, Zofran 4 mg, potassium chloride 40, NS 1 L    Hospital Course:  Upon admission to general floors, there was low suspicion of abdominal pain being 2/2 Crohn's as there was no bloody diarrhea and reported weight loss by patient. Pt reported abdominal pain being different to last abdominal pain that was 2/2 Crohn's. Pt was monitored overnight. Surgery consulted, no surgical intervention indicated and recommended to advance patient to clear liquid diet. Patient tolerated clear liquids well, continued to advance to soft diet and patient overall tolerated well. GI consulted for possible Crohn's flare, started on  budesonide 6 mg daily and referral placed for outpatient follow up to resume biologic therapy for Crohn's.    Urine culture obtained at admission did grow gram negative bacilli, patient was started on Bactrim for 3 day course as she did admit to dysuria that started 2-3 days prior to admission.    As patient continued to tolerate diet with formed bowel movements, discharged home in stable conditioned on 3/25/25. Referral was placed for GI follow up outpatient as well as follow up with PCP. Discharge plan and instructions were discussed with patient who related understanding and was agreeable.    Pertinent Physical Exam At Time of Discharge  Physical Exam  Constitutional:       General: She is not in acute distress.  HENT:      Head: Normocephalic and atraumatic.   Cardiovascular:      Rate and Rhythm: Normal rate and regular rhythm.   Pulmonary:      Effort: Pulmonary effort is normal. No respiratory distress.      Breath sounds: Normal breath sounds. No wheezing or rales.   Abdominal:      General: Bowel sounds are normal. There is no distension.      Palpations: Abdomen is soft.      Tenderness: Mild abdominal tenderness   Musculoskeletal:      Right lower leg: No edema.      Left lower leg: No edema.   Skin:     General: Skin is warm and dry.   Neurological:      General: No focal deficit present.      Mental Status: She is alert.   Psychiatric:         Mood and Affect: Mood normal.         Behavior: Behavior normal.     Outpatient Follow-Up  No future appointments.      Eve Pham MD  Internal Medicine, PGY-1

## 2025-03-25 NOTE — DISCHARGE INSTR - APPOINTMENTS
[facility] has a 4-6 month waiting list- they are referring people to    [place] [phone]    call our central intake at [phone]. Intake specialists are available 8 a.m. to 4 p.m., Monday through Friday, to help connect you to the services you need.

## 2025-03-25 NOTE — CARE PLAN
Uneventful night. Pt rested comfortably. No c/o pain this shift. Pt tolerating full liquids without difficulty. Pt continues to progress towards meeting goals. Probable DC home today. VSS. Will continue to monitor.

## 2025-03-25 NOTE — PROGRESS NOTES
"Kym Jennings is a 44 y.o. female on day 1 of admission presenting with Disorder of intestine.    Subjective   No acute overnight issues.  Tolerating oral diet       Objective     Physical Exam  Eyes:      General: No scleral icterus.  Cardiovascular:      Rate and Rhythm: Normal rate and regular rhythm.   Pulmonary:      Effort: Pulmonary effort is normal. No respiratory distress.      Breath sounds: Normal breath sounds.   Abdominal:      General: Abdomen is flat. Bowel sounds are normal.      Palpations: Abdomen is soft.      Comments: Healed scar   Neurological:      Mental Status: She is alert and oriented to person, place, and time.         Last Recorded Vitals  Blood pressure 124/71, pulse 68, temperature 36.7 °C (98.1 °F), temperature source Temporal, resp. rate 16, height 1.753 m (5' 9\"), weight 61.2 kg (135 lb), SpO2 92%.  Intake/Output last 3 Shifts:  I/O last 3 completed shifts:  In: 3138.3 (51.3 mL/kg) [P.O.:1080; I.V.:1058.3 (17.3 mL/kg); IV Piggyback:1000]  Out: 900 (14.7 mL/kg) [Urine:900 (0.4 mL/kg/hr)]  Weight: 61.2 kg     Relevant Results      ECG 12 lead    Result Date: 3/24/2025  Normal sinus rhythm Normal ECG When compared with ECG of 07-DEC-2022 09:04, Vent. rate has decreased BY  33 BPM    CT abdomen pelvis w IV contrast    Result Date: 3/23/2025  Interpreted By:  Kathe Aleman, STUDY: CT ABDOMEN PELVIS W IV CONTRAST;  3/23/2025 1:25 am   INDICATION: Signs/Symptoms:foul smelling diarrhea and diffuse abdominal x one week.   COMPARISON: 09/06/2022   ACCESSION NUMBER(S): [id number]   ORDERING CLINICIAN: EVELINA FALK   TECHNIQUE: Axial CT images of the abdomen and pelvis with coronal and sagittal reconstructed images obtained after intravenous administration of contrast   FINDINGS: LOWER CHEST: No acute abnormality of the lung bases. BONES: Interval development diffuse osseous sclerosis involving the entire imaged skeleton. No significant cortical thickening or osseous expansion. There are " erosions at the pubic symphysis and subligamentous/subtendinous erosions involving the upper sacroiliac joint and pubic body. No significant enthesopathic changes or lytic lesions. ABDOMINAL WALL: Within normal limits.   ABDOMEN:   LIVER: Enlarged, 20 cm in craniocaudad length. Parenchymal attenuation is within normal limits. BILE DUCTS: No biliary dilatation. GALLBLADDER: Cholecystectomy. PANCREAS: Within normal limits. SPLEEN: Few scattered calcified splenic granulomas. ADRENALS: Within normal limits. KIDNEYS and URETERS: Kidneys are normal in size. Symmetric renal enhancement. No hydronephrosis or perinephric fluid collection.     VESSELS: No aortic aneurysm. RETROPERITONEUM: No pathologically enlarged retroperitoneal lymph nodes.   PELVIS:   REPRODUCTIVE ORGANS: Status post hysterectomy. BLADDER: Within normal limits.   BOWEL: No dilated bowel. Postsurgical changes of the right hemicolon/distal ilium with ileocolic anastomosis. There is long segment wall thickening and mucosal hyperenhancement of multiple loops of distal ileum, some of which are dilated. There is long segment narrowing of the distal ileum/terminal ileum. The appendix has been removed. PERITONEUM: No ascites or free air, no fluid collection.       Inflammatory changes involving several loops of distal ileum suggesting infectious or inflammatory enteritis. The distal ileum/terminal ileum is diffusely narrowed, likely stricture. Of small bowel loops are mildly dilated, suggesting partial obstruction.   Interval development of diffuse osseous sclerosis. Please correlate clinically for hyperparathyroidism. Additional considerations include systemic diseases such as leukemia/lymphoma, anemias, myelofibrosis (although these may be less likely as the spleen is normal in size).   Mild hepatomegaly.   MACRO: None   Signed by: Kathe Aleman 3/23/2025 2:04 AM Dictation workstation:   [id number]    * Cannot find OR log *  Last relevant procedure:                           Assessment/Plan   Assessment & Plan  Disorder of intestine    Ileocolonic Crohn's SP bowel resection-was on multiple medication, was noncompliant.  Received only 1 dose of Skyrizi in 2022  Partial bowel obstruction likely related to underlying Crohn's-resolved           DC with budesonide 6 mg daily for 1 month then   Budesonide 3 mg daily for a month.  Soft diet then regular as tolerated  Must need smoking cessation.  Correct hypomagnesemia  Follow-up with me  as outpt                Ginny Mosqueda MD

## 2025-03-25 NOTE — DISCHARGE INSTRUCTIONS
Please, take your home medications as instructed after being discharged from the hospital.     NEW MEDICATIONS:  -For UTI: Take Bactrim 1 tablet twice daily for 3 days total (or until bottle is empty) - you received first dose in the hospital so will have 5 doses remaining at home     -For Crohn's: Take Budesonide 6 mg (2 capsules) daily      OTHER INSTRUCTIONS:  -For Crohn's, please follow with GI outpatient to restart management  A referral has been placed with Dr. Mosqueda, they should call you to schedule, or you may call the number below to schedule as well     UPCOMING APPOINTMENTS:     Please, follow-up with your PCP within 7 to 14 days after leaving the hospital. /appointment services has been requested to make an appointment for you, however if you do not hear back from them within 1 to 2 days, please call your primary physician's office to schedule an appointment. Bring your photo ID and insurance card to your appointment.   [facility]  services can be reached at [phone].     If you experience any worsening symptoms or have any concerns, please contact your primary care provider to schedule an appointment. If you cannot get in touch with your primary care physician, please return to the nearest emergency room or urgent care clinic for an evaluation and treatment.     Thank you for choosing [facility] and allowing us to partake in your medical care!     - Your [facility] inpatient primary care team.

## 2025-03-25 NOTE — CARE PLAN
Problem: Pain - Adult  Goal: Verbalizes/displays adequate comfort level or baseline comfort level  Outcome: Met     Problem: Chronic Conditions and Co-morbidities  Goal: Patient's chronic conditions and co-morbidity symptoms are monitored and maintained or improved  Outcome: Met     Problem: Nutrition  Goal: Nutrient intake appropriate for maintaining nutritional needs  Outcome: Met    The clinical goals for the shift include tolerate regular diet    Over the shift, the patient did well with managed pain and tolerating diet. Patient getting ready for discharge.

## 2025-03-25 NOTE — CARE PLAN
The patient's goals for the shift include sleep and no pain    The clinical goals for the shift include pt will continue to tolerate full liquids and have minimal c/o pain/N/V this shift

## 2025-03-25 NOTE — PROGRESS NOTES
03/25/25 1248   Discharge Planning   Assistance Needed [facility] called and spoke with [facility] who stated that pt is closed with them for services and there is currently a 4-6 month waiting list for new pts. [facility] is advising pts to seek care elsewhere. They suggested [facility] in [facility]. SW met with pt and updated and placed information for [facility] on pt discharge paperwork.

## 2025-03-25 NOTE — NURSING NOTE
Discharge instructions reviewed with patient. No questions at this time. Education given for new homegoing medications with type, uses, and most common side effects. Patient verbalized understanding.  Telemetry removed and left at nurses station, masimo removed and left at bedside. IV removed. Discharged home in stable condition.

## 2025-03-26 ENCOUNTER — TELEPHONE (OUTPATIENT)
Dept: GASTROENTEROLOGY | Facility: CLINIC | Age: 45
End: 2025-03-26
Payer: MEDICAID

## 2025-03-26 LAB — BACTERIA UR CULT: ABNORMAL

## 2025-03-26 NOTE — TELEPHONE ENCOUNTER
----- Message from Ginny Mosqueda sent at 3/25/2025  1:11 PM EDT -----  Clinic schedule with me in hospital office due to chron's disease

## 2025-03-28 NOTE — SIGNIFICANT EVENT
Follow Up Phone Call    Outgoing phone call    Spoke to: Kym Jennings Relationship:self   Phone number: [phone]      Outcome: I left a message on answering machine   Chief Complaint   Patient presents with    Flu Symptoms          Diagnosis:Not applicable

## 2025-07-03 ENCOUNTER — APPOINTMENT (OUTPATIENT)
Dept: GASTROENTEROLOGY | Facility: CLINIC | Age: 45
End: 2025-07-03
Payer: MEDICAID

## 2025-08-29 ENCOUNTER — APPOINTMENT (OUTPATIENT)
Dept: CARDIOLOGY | Facility: HOSPITAL | Age: 45
End: 2025-08-29
Payer: MEDICAID

## 2025-08-29 ENCOUNTER — APPOINTMENT (OUTPATIENT)
Dept: RADIOLOGY | Facility: HOSPITAL | Age: 45
End: 2025-08-29
Payer: MEDICAID

## 2025-08-29 ENCOUNTER — HOSPITAL ENCOUNTER (EMERGENCY)
Facility: HOSPITAL | Age: 45
Discharge: HOME | End: 2025-08-29
Payer: MEDICAID

## 2025-08-29 ASSESSMENT — LIFESTYLE VARIABLES
HAVE YOU EVER FELT YOU SHOULD CUT DOWN ON YOUR DRINKING: NO
TOTAL SCORE: 0
EVER HAD A DRINK FIRST THING IN THE MORNING TO STEADY YOUR NERVES TO GET RID OF A HANGOVER: NO
HAVE PEOPLE ANNOYED YOU BY CRITICIZING YOUR DRINKING: NO
EVER FELT BAD OR GUILTY ABOUT YOUR DRINKING: NO

## 2025-08-29 ASSESSMENT — PAIN DESCRIPTION - PAIN TYPE: TYPE: ACUTE PAIN

## 2025-08-29 ASSESSMENT — PAIN - FUNCTIONAL ASSESSMENT: PAIN_FUNCTIONAL_ASSESSMENT: 0-10

## 2025-08-29 ASSESSMENT — PAIN DESCRIPTION - LOCATION: LOCATION: EAR

## 2025-08-29 ASSESSMENT — PAIN DESCRIPTION - DESCRIPTORS: DESCRIPTORS: ACHING

## 2025-08-29 ASSESSMENT — PAIN SCALES - GENERAL: PAINLEVEL_OUTOF10: 4

## 2025-09-05 ENCOUNTER — APPOINTMENT (OUTPATIENT)
Dept: PRIMARY CARE | Facility: CLINIC | Age: 45
End: 2025-09-05
Payer: MEDICAID